# Patient Record
Sex: FEMALE | Race: WHITE | NOT HISPANIC OR LATINO | Employment: FULL TIME | URBAN - METROPOLITAN AREA
[De-identification: names, ages, dates, MRNs, and addresses within clinical notes are randomized per-mention and may not be internally consistent; named-entity substitution may affect disease eponyms.]

---

## 2021-11-15 ENCOUNTER — APPOINTMENT (OUTPATIENT)
Dept: GENERAL RADIOLOGY | Age: 61
End: 2021-11-15
Attending: EMERGENCY MEDICINE

## 2021-11-15 LAB
ALBUMIN SERPL-MCNC: 3.7 G/DL (ref 3.6–5.1)
ALBUMIN/GLOB SERPL: 1 {RATIO} (ref 1–2.4)
ALP SERPL-CCNC: 59 UNITS/L (ref 45–117)
ALT SERPL-CCNC: 33 UNITS/L
ANION GAP SERPL CALC-SCNC: 9 MMOL/L (ref 10–20)
AST SERPL-CCNC: 16 UNITS/L
BASOPHILS # BLD: 0 K/MCL (ref 0–0.3)
BASOPHILS NFR BLD: 1 %
BILIRUB SERPL-MCNC: 0.4 MG/DL (ref 0.2–1)
BUN SERPL-MCNC: 18 MG/DL (ref 6–20)
BUN/CREAT SERPL: 22 (ref 7–25)
CALCIUM SERPL-MCNC: 9.1 MG/DL (ref 8.4–10.2)
CHLORIDE SERPL-SCNC: 110 MMOL/L (ref 98–107)
CO2 SERPL-SCNC: 24 MMOL/L (ref 21–32)
CREAT SERPL-MCNC: 0.82 MG/DL (ref 0.51–0.95)
DEPRECATED RDW RBC: 45.8 FL (ref 39–50)
EOSINOPHIL # BLD: 0.2 K/MCL (ref 0–0.5)
EOSINOPHIL NFR BLD: 4 %
ERYTHROCYTE [DISTWIDTH] IN BLOOD: 14.1 % (ref 11–15)
FASTING DURATION TIME PATIENT: ABNORMAL H
GFR SERPLBLD BASED ON 1.73 SQ M-ARVRAT: 77 ML/MIN
GLOBULIN SER-MCNC: 3.8 G/DL (ref 2–4)
GLUCOSE SERPL-MCNC: 143 MG/DL (ref 70–99)
HCT VFR BLD CALC: 41.9 % (ref 36–46.5)
HGB BLD-MCNC: 13.6 G/DL (ref 12–15.5)
IMM GRANULOCYTES # BLD AUTO: 0 K/MCL (ref 0–0.2)
IMM GRANULOCYTES # BLD: 0 %
LYMPHOCYTES # BLD: 1.6 K/MCL (ref 1–4)
LYMPHOCYTES NFR BLD: 27 %
MCH RBC QN AUTO: 28.6 PG (ref 26–34)
MCHC RBC AUTO-ENTMCNC: 32.5 G/DL (ref 32–36.5)
MCV RBC AUTO: 88.2 FL (ref 78–100)
MONOCYTES # BLD: 0.5 K/MCL (ref 0.3–0.9)
MONOCYTES NFR BLD: 9 %
NEUTROPHILS # BLD: 3.4 K/MCL (ref 1.8–7.7)
NEUTROPHILS NFR BLD: 59 %
NRBC BLD MANUAL-RTO: 0 /100 WBC
PLATELET # BLD AUTO: 206 K/MCL (ref 140–450)
POTASSIUM SERPL-SCNC: 3.9 MMOL/L (ref 3.4–5.1)
PROT SERPL-MCNC: 7.5 G/DL (ref 6.4–8.2)
RBC # BLD: 4.75 MIL/MCL (ref 4–5.2)
SODIUM SERPL-SCNC: 139 MMOL/L (ref 135–145)
TROPONIN I SERPL DL<=0.01 NG/ML-MCNC: <4 NG/L
WBC # BLD: 5.7 K/MCL (ref 4.2–11)

## 2021-11-15 PROCEDURE — 93005 ELECTROCARDIOGRAM TRACING: CPT | Performed by: EMERGENCY MEDICINE

## 2021-11-15 PROCEDURE — 99285 EMERGENCY DEPT VISIT HI MDM: CPT

## 2021-11-15 PROCEDURE — 84484 ASSAY OF TROPONIN QUANT: CPT | Performed by: EMERGENCY MEDICINE

## 2021-11-15 PROCEDURE — 71046 X-RAY EXAM CHEST 2 VIEWS: CPT

## 2021-11-15 PROCEDURE — 85025 COMPLETE CBC W/AUTO DIFF WBC: CPT | Performed by: EMERGENCY MEDICINE

## 2021-11-15 PROCEDURE — 36415 COLL VENOUS BLD VENIPUNCTURE: CPT

## 2021-11-15 PROCEDURE — M0243 CASIRIVI AND IMDEVI INFUSION: HCPCS

## 2021-11-15 PROCEDURE — 80053 COMPREHEN METABOLIC PANEL: CPT | Performed by: EMERGENCY MEDICINE

## 2021-11-15 ASSESSMENT — PAIN SCALES - GENERAL: PAINLEVEL_OUTOF10: 5

## 2021-11-16 ENCOUNTER — HOSPITAL ENCOUNTER (EMERGENCY)
Age: 61
Discharge: HOME | End: 2021-11-16
Attending: EMERGENCY MEDICINE

## 2021-11-16 VITALS
TEMPERATURE: 98.4 F | RESPIRATION RATE: 16 BRPM | HEART RATE: 80 BPM | OXYGEN SATURATION: 93 % | SYSTOLIC BLOOD PRESSURE: 139 MMHG | DIASTOLIC BLOOD PRESSURE: 70 MMHG

## 2021-11-16 DIAGNOSIS — U07.1 COVID-19 VIRUS DETECTED: Primary | ICD-10-CM

## 2021-11-16 LAB
ATRIAL RATE (BPM): 96
P AXIS (DEGREES): 44
PR-INTERVAL (MSEC): 164
QRS-INTERVAL (MSEC): 84
QT-INTERVAL (MSEC): 338
QTC: 427
R AXIS (DEGREES): 60
REPORT TEXT: NORMAL
T AXIS (DEGREES): 86
VENTRICULAR RATE EKG/MIN (BPM): 96

## 2021-11-16 PROCEDURE — 99285 EMERGENCY DEPT VISIT HI MDM: CPT | Performed by: STUDENT IN AN ORGANIZED HEALTH CARE EDUCATION/TRAINING PROGRAM

## 2021-11-16 PROCEDURE — 10002807 HB RX 258: Performed by: STUDENT IN AN ORGANIZED HEALTH CARE EDUCATION/TRAINING PROGRAM

## 2021-11-16 PROCEDURE — 10002800 HB RX 250 W HCPCS: Performed by: STUDENT IN AN ORGANIZED HEALTH CARE EDUCATION/TRAINING PROGRAM

## 2021-11-16 RX ORDER — 0.9 % SODIUM CHLORIDE 0.9 %
2 VIAL (ML) INJECTION EVERY 12 HOURS SCHEDULED
Status: DISCONTINUED | OUTPATIENT
Start: 2021-11-16 | End: 2021-11-16 | Stop reason: HOSPADM

## 2021-11-16 RX ORDER — ALBUTEROL SULFATE 2.5 MG/3ML
5 SOLUTION RESPIRATORY (INHALATION)
Status: DISCONTINUED | OUTPATIENT
Start: 2021-11-16 | End: 2021-11-16 | Stop reason: HOSPADM

## 2021-11-16 RX ORDER — METHYLPREDNISOLONE SODIUM SUCCINATE 125 MG/2ML
125 INJECTION, POWDER, LYOPHILIZED, FOR SOLUTION INTRAMUSCULAR; INTRAVENOUS
Status: DISCONTINUED | OUTPATIENT
Start: 2021-11-16 | End: 2021-11-16 | Stop reason: HOSPADM

## 2021-11-16 RX ORDER — SODIUM CHLORIDE 9 MG/ML
INJECTION, SOLUTION INTRAVENOUS CONTINUOUS PRN
Status: DISCONTINUED | OUTPATIENT
Start: 2021-11-16 | End: 2021-11-16 | Stop reason: HOSPADM

## 2021-11-16 RX ORDER — DIPHENHYDRAMINE HYDROCHLORIDE 50 MG/ML
50 INJECTION INTRAMUSCULAR; INTRAVENOUS
Status: DISCONTINUED | OUTPATIENT
Start: 2021-11-16 | End: 2021-11-16 | Stop reason: HOSPADM

## 2021-11-16 RX ORDER — FAMOTIDINE 10 MG/ML
20 INJECTION, SOLUTION INTRAVENOUS
Status: DISCONTINUED | OUTPATIENT
Start: 2021-11-16 | End: 2021-11-16 | Stop reason: HOSPADM

## 2021-11-16 RX ADMIN — Medication 600 MG OF CASIRIVIMAB: at 13:07

## 2021-11-16 ASSESSMENT — ENCOUNTER SYMPTOMS
BACK PAIN: 0
BLOOD IN STOOL: 0
APPETITE CHANGE: 0
FEVER: 0
NUMBNESS: 0
COUGH: 1
DIZZINESS: 0
NAUSEA: 0
ACTIVITY CHANGE: 0
SHORTNESS OF BREATH: 0
DIARRHEA: 0
EYE PAIN: 0
HEADACHES: 0
VOMITING: 0
SINUS PRESSURE: 1
RHINORRHEA: 0
LIGHT-HEADEDNESS: 0
WEAKNESS: 0
ABDOMINAL PAIN: 0
CHILLS: 0
WHEEZING: 0
UNEXPECTED WEIGHT CHANGE: 0
ABDOMINAL DISTENTION: 0
TROUBLE SWALLOWING: 0

## 2021-12-23 ENCOUNTER — HOSPITAL ENCOUNTER (OUTPATIENT)
Dept: CT IMAGING | Age: 61
End: 2021-12-23
Attending: INTERNAL MEDICINE

## 2022-08-27 ENCOUNTER — TELEPHONE (OUTPATIENT)
Dept: GENERAL RADIOLOGY | Age: 62
End: 2022-08-27

## 2022-08-30 ENCOUNTER — HOSPITAL ENCOUNTER (OUTPATIENT)
Dept: NUCLEAR MEDICINE | Age: 62
Discharge: HOME | End: 2022-08-30
Attending: SPECIALIST

## 2022-08-30 ENCOUNTER — HOSPITAL ENCOUNTER (OUTPATIENT)
Dept: CARDIOLOGY | Age: 62
Discharge: HOME | End: 2022-08-30
Attending: SPECIALIST

## 2022-08-30 VITALS — SYSTOLIC BLOOD PRESSURE: 131 MMHG | DIASTOLIC BLOOD PRESSURE: 81 MMHG | OXYGEN SATURATION: 96 % | HEART RATE: 91 BPM

## 2022-08-30 DIAGNOSIS — R06.00 DYSPNEA: ICD-10-CM

## 2022-08-30 DIAGNOSIS — I83.893 VARICOSE VEINS OF BILATERAL LOWER EXTREMITIES WITH OTHER COMPLICATIONS: ICD-10-CM

## 2022-08-30 LAB — STRESS TARGET HR: 157 BPM

## 2022-08-30 PROCEDURE — 78452 HT MUSCLE IMAGE SPECT MULT: CPT

## 2022-08-30 PROCEDURE — A9500 TC99M SESTAMIBI: HCPCS | Performed by: SPECIALIST

## 2022-08-30 PROCEDURE — 10006150 HB RX 343: Performed by: SPECIALIST

## 2022-08-30 PROCEDURE — 10002800 HB RX 250 W HCPCS: Performed by: SPECIALIST

## 2022-08-30 PROCEDURE — 93017 CV STRESS TEST TRACING ONLY: CPT

## 2022-08-30 RX ORDER — REGADENOSON 0.08 MG/ML
0.4 INJECTION, SOLUTION INTRAVENOUS ONCE
Status: COMPLETED | OUTPATIENT
Start: 2022-08-30 | End: 2022-08-30

## 2022-08-30 RX ORDER — TETRAKIS(2-METHOXYISOBUTYLISOCYANIDE)COPPER(I) TETRAFLUOROBORATE 1 MG/ML
10.4 INJECTION, POWDER, LYOPHILIZED, FOR SOLUTION INTRAVENOUS ONCE
Status: COMPLETED | OUTPATIENT
Start: 2022-08-30 | End: 2022-08-30

## 2022-08-30 RX ORDER — TETRAKIS(2-METHOXYISOBUTYLISOCYANIDE)COPPER(I) TETRAFLUOROBORATE 1 MG/ML
31.4 INJECTION, POWDER, LYOPHILIZED, FOR SOLUTION INTRAVENOUS ONCE
Status: COMPLETED | OUTPATIENT
Start: 2022-08-30 | End: 2022-08-30

## 2022-08-30 RX ADMIN — TETRAKIS(2-METHOXYISOBUTYLISOCYANIDE)COPPER(I) TETRAFLUOROBORATE 10.4 MILLICURIE: 1 INJECTION, POWDER, LYOPHILIZED, FOR SOLUTION INTRAVENOUS at 08:01

## 2022-08-30 RX ADMIN — REGADENOSON 0.4 MG: 0.08 INJECTION, SOLUTION INTRAVENOUS at 09:21

## 2022-08-30 RX ADMIN — TETRAKIS(2-METHOXYISOBUTYLISOCYANIDE)COPPER(I) TETRAFLUOROBORATE 31.4 MILLICURIE: 1 INJECTION, POWDER, LYOPHILIZED, FOR SOLUTION INTRAVENOUS at 10:15

## 2022-10-27 ENCOUNTER — APPOINTMENT (OUTPATIENT)
Dept: ULTRASOUND IMAGING | Age: 62
End: 2022-10-27
Attending: EMERGENCY MEDICINE

## 2022-10-27 VITALS
SYSTOLIC BLOOD PRESSURE: 142 MMHG | HEART RATE: 84 BPM | OXYGEN SATURATION: 97 % | TEMPERATURE: 98.2 F | DIASTOLIC BLOOD PRESSURE: 79 MMHG | RESPIRATION RATE: 16 BRPM

## 2022-10-27 LAB
ALBUMIN SERPL-MCNC: 3.7 G/DL (ref 3.6–5.1)
ALBUMIN/GLOB SERPL: 1.1 {RATIO} (ref 1–2.4)
ALP SERPL-CCNC: 62 UNITS/L (ref 45–117)
ALT SERPL-CCNC: 32 UNITS/L
ANION GAP SERPL CALC-SCNC: 9 MMOL/L (ref 7–19)
AST SERPL-CCNC: 18 UNITS/L
ATRIAL RATE (BPM): 98
BASOPHILS # BLD: 0.1 K/MCL (ref 0–0.3)
BASOPHILS NFR BLD: 1 %
BILIRUB SERPL-MCNC: 0.6 MG/DL (ref 0.2–1)
BUN SERPL-MCNC: 26 MG/DL (ref 6–20)
BUN/CREAT SERPL: 31 (ref 7–25)
CALCIUM SERPL-MCNC: 9.3 MG/DL (ref 8.4–10.2)
CHLORIDE SERPL-SCNC: 109 MMOL/L (ref 97–110)
CO2 SERPL-SCNC: 27 MMOL/L (ref 21–32)
CREAT SERPL-MCNC: 0.85 MG/DL (ref 0.51–0.95)
DEPRECATED RDW RBC: 44.5 FL (ref 39–50)
EOSINOPHIL # BLD: 0.3 K/MCL (ref 0–0.5)
EOSINOPHIL NFR BLD: 4 %
ERYTHROCYTE [DISTWIDTH] IN BLOOD: 13.8 % (ref 11–15)
FASTING DURATION TIME PATIENT: ABNORMAL H
GFR SERPLBLD BASED ON 1.73 SQ M-ARVRAT: 77 ML/MIN
GLOBULIN SER-MCNC: 3.5 G/DL (ref 2–4)
GLUCOSE SERPL-MCNC: 113 MG/DL (ref 70–99)
HCT VFR BLD CALC: 40.5 % (ref 36–46.5)
HGB BLD-MCNC: 13.3 G/DL (ref 12–15.5)
IMM GRANULOCYTES # BLD AUTO: 0 K/MCL (ref 0–0.2)
IMM GRANULOCYTES # BLD: 0 %
LYMPHOCYTES # BLD: 2 K/MCL (ref 1–4)
LYMPHOCYTES NFR BLD: 27 %
MCH RBC QN AUTO: 29 PG (ref 26–34)
MCHC RBC AUTO-ENTMCNC: 32.8 G/DL (ref 32–36.5)
MCV RBC AUTO: 88.2 FL (ref 78–100)
MONOCYTES # BLD: 0.5 K/MCL (ref 0.3–0.9)
MONOCYTES NFR BLD: 6 %
NEUTROPHILS # BLD: 4.6 K/MCL (ref 1.8–7.7)
NEUTROPHILS NFR BLD: 62 %
NRBC BLD MANUAL-RTO: 0 /100 WBC
P AXIS (DEGREES): 55
PLATELET # BLD AUTO: 242 K/MCL (ref 140–450)
POTASSIUM SERPL-SCNC: 4.1 MMOL/L (ref 3.4–5.1)
PR-INTERVAL (MSEC): 188
PROT SERPL-MCNC: 7.2 G/DL (ref 6.4–8.2)
QRS-INTERVAL (MSEC): 86
QT-INTERVAL (MSEC): 360
QTC: 460
R AXIS (DEGREES): 61
RBC # BLD: 4.59 MIL/MCL (ref 4–5.2)
REPORT TEXT: NORMAL
SODIUM SERPL-SCNC: 141 MMOL/L (ref 135–145)
T AXIS (DEGREES): 63
VENTRICULAR RATE EKG/MIN (BPM): 98
WBC # BLD: 7.5 K/MCL (ref 4.2–11)

## 2022-10-27 PROCEDURE — 93010 ELECTROCARDIOGRAM REPORT: CPT | Performed by: INTERNAL MEDICINE

## 2022-10-27 PROCEDURE — 36415 COLL VENOUS BLD VENIPUNCTURE: CPT

## 2022-10-27 PROCEDURE — 93971 EXTREMITY STUDY: CPT

## 2022-10-27 PROCEDURE — 99284 EMERGENCY DEPT VISIT MOD MDM: CPT

## 2022-10-27 PROCEDURE — 85025 COMPLETE CBC W/AUTO DIFF WBC: CPT | Performed by: EMERGENCY MEDICINE

## 2022-10-27 PROCEDURE — 93005 ELECTROCARDIOGRAM TRACING: CPT | Performed by: EMERGENCY MEDICINE

## 2022-10-27 PROCEDURE — 80053 COMPREHEN METABOLIC PANEL: CPT | Performed by: EMERGENCY MEDICINE

## 2022-10-27 ASSESSMENT — PAIN SCALES - GENERAL: PAINLEVEL_OUTOF10: 8

## 2022-10-28 ENCOUNTER — HOSPITAL ENCOUNTER (EMERGENCY)
Age: 62
Discharge: HOME | End: 2022-10-28
Attending: EMERGENCY MEDICINE

## 2022-10-28 DIAGNOSIS — M79.604 PAIN OF RIGHT LOWER EXTREMITY: Primary | ICD-10-CM

## 2022-10-28 DIAGNOSIS — M71.21 SYNOVIAL CYST OF RIGHT POPLITEAL SPACE: ICD-10-CM

## 2022-10-28 PROCEDURE — 99284 EMERGENCY DEPT VISIT MOD MDM: CPT | Performed by: EMERGENCY MEDICINE

## 2022-10-28 ASSESSMENT — ENCOUNTER SYMPTOMS
DIARRHEA: 0
NAUSEA: 0
SORE THROAT: 0
DIAPHORESIS: 0
FEVER: 0
VOMITING: 0
SHORTNESS OF BREATH: 0
HEADACHES: 0
ABDOMINAL PAIN: 0

## 2023-09-22 DIAGNOSIS — M76.821 POSTERIOR TIBIAL TENDINITIS OF RIGHT LEG: Primary | ICD-10-CM

## 2023-09-28 ENCOUNTER — HOSPITAL ENCOUNTER (OUTPATIENT)
Dept: MRI IMAGING | Age: 63
Discharge: HOME | End: 2023-09-28
Attending: PODIATRIST

## 2023-09-28 DIAGNOSIS — M76.821 POSTERIOR TIBIAL TENDINITIS OF RIGHT LEG: ICD-10-CM

## 2023-09-28 PROCEDURE — G1004 CDSM NDSC: HCPCS

## 2023-09-28 PROCEDURE — 73721 MRI JNT OF LWR EXTRE W/O DYE: CPT

## 2024-08-01 DIAGNOSIS — M25.511 PAIN IN RIGHT SHOULDER: Primary | ICD-10-CM

## 2024-08-13 ENCOUNTER — HOSPITAL ENCOUNTER (OUTPATIENT)
Dept: MRI IMAGING | Age: 64
Discharge: HOME | End: 2024-08-13
Attending: FAMILY MEDICINE

## 2024-08-13 DIAGNOSIS — M25.511 PAIN IN RIGHT SHOULDER: ICD-10-CM

## 2024-08-13 PROCEDURE — 73221 MRI JOINT UPR EXTREM W/O DYE: CPT

## 2024-10-02 DIAGNOSIS — I50.32 CHRONIC DIASTOLIC HEART FAILURE  (CMD): Primary | ICD-10-CM

## 2024-10-25 ENCOUNTER — HOSPITAL ENCOUNTER (OUTPATIENT)
Dept: NUCLEAR MEDICINE | Age: 64
End: 2024-10-25
Attending: SPECIALIST

## 2024-10-25 ENCOUNTER — HOSPITAL ENCOUNTER (OUTPATIENT)
Dept: CARDIOLOGY | Age: 64
End: 2024-10-25
Attending: SPECIALIST

## 2024-10-25 VITALS
DIASTOLIC BLOOD PRESSURE: 84 MMHG | OXYGEN SATURATION: 96 % | RESPIRATION RATE: 17 BRPM | HEART RATE: 68 BPM | HEIGHT: 65 IN | BODY MASS INDEX: 41.65 KG/M2 | SYSTOLIC BLOOD PRESSURE: 129 MMHG | WEIGHT: 250 LBS

## 2024-10-25 DIAGNOSIS — I50.32 CHRONIC DIASTOLIC HEART FAILURE  (CMD): ICD-10-CM

## 2024-10-25 PROCEDURE — 78452 HT MUSCLE IMAGE SPECT MULT: CPT

## 2024-10-25 PROCEDURE — 78452 HT MUSCLE IMAGE SPECT MULT: CPT | Performed by: INTERNAL MEDICINE

## 2024-10-25 PROCEDURE — 93018 CV STRESS TEST I&R ONLY: CPT | Performed by: INTERNAL MEDICINE

## 2024-10-25 PROCEDURE — 93017 CV STRESS TEST TRACING ONLY: CPT

## 2024-10-25 PROCEDURE — 93016 CV STRESS TEST SUPVJ ONLY: CPT | Performed by: INTERNAL MEDICINE

## 2024-10-25 PROCEDURE — 10006150 HB RX 343: Performed by: SPECIALIST

## 2024-10-25 PROCEDURE — A9500 TC99M SESTAMIBI: HCPCS | Performed by: SPECIALIST

## 2024-10-25 RX ORDER — TETRAKIS(2-METHOXYISOBUTYLISOCYANIDE)COPPER(I) TETRAFLUOROBORATE 1 MG/ML
8.87 INJECTION, POWDER, LYOPHILIZED, FOR SOLUTION INTRAVENOUS ONCE
Status: COMPLETED | OUTPATIENT
Start: 2024-10-25 | End: 2024-10-25

## 2024-10-25 RX ORDER — TETRAKIS(2-METHOXYISOBUTYLISOCYANIDE)COPPER(I) TETRAFLUOROBORATE 1 MG/ML
25 INJECTION, POWDER, LYOPHILIZED, FOR SOLUTION INTRAVENOUS ONCE
Status: COMPLETED | OUTPATIENT
Start: 2024-10-25 | End: 2024-10-25

## 2024-10-25 RX ADMIN — TETRAKIS(2-METHOXYISOBUTYLISOCYANIDE)COPPER(I) TETRAFLUOROBORATE 8.87 MILLICURIE: 1 INJECTION, POWDER, LYOPHILIZED, FOR SOLUTION INTRAVENOUS at 07:40

## 2024-10-25 RX ADMIN — TETRAKIS(2-METHOXYISOBUTYLISOCYANIDE)COPPER(I) TETRAFLUOROBORATE 25 MILLICURIE: 1 INJECTION, POWDER, LYOPHILIZED, FOR SOLUTION INTRAVENOUS at 08:58

## 2024-12-26 DIAGNOSIS — M25.562 PAIN IN LEFT KNEE: Primary | ICD-10-CM

## 2024-12-26 DIAGNOSIS — G47.30 SLEEP APNEA: ICD-10-CM

## 2025-01-11 ENCOUNTER — HOSPITAL ENCOUNTER (OUTPATIENT)
Dept: MRI IMAGING | Age: 65
End: 2025-01-11
Attending: FAMILY MEDICINE

## 2025-01-11 DIAGNOSIS — M25.562 PAIN IN LEFT KNEE: ICD-10-CM

## 2025-01-11 DIAGNOSIS — G47.30 SLEEP APNEA: ICD-10-CM

## 2025-01-11 PROCEDURE — 73721 MRI JNT OF LWR EXTRE W/O DYE: CPT

## 2025-01-29 ENCOUNTER — EXTERNAL LAB (OUTPATIENT)
Dept: HEALTH INFORMATION MANAGEMENT | Facility: OTHER | Age: 65
End: 2025-01-29

## 2025-01-29 LAB
ALBUMIN SERPL-MCNC: 4.3 G/DL (ref 3.9–4.9)
ALP SERPL-CCNC: 79 IU/L (ref 44–121)
ALT SERPL-CCNC: 16 IU/L (ref 0–32)
AST SERPL-CCNC: 17 IU/L (ref 0–40)
BASOPHILS # BLD: 0.1 X10E3/UL (ref 0–0.2)
BASOPHILS NFR BLD: 1 %
BILIRUB SERPL-MCNC: 0.4 MG/DL (ref 0–1.2)
BUN SERPL-MCNC: 20 MG/DL (ref 8–27)
BUN/CREAT SERPL: 28 (ref 12–28)
CALCIUM SERPL-MCNC: 9.4 MG/DL (ref 8.7–10.3)
CHLORIDE SERPL-SCNC: 105 MMOL/L (ref 96–106)
CO2 SERPL-SCNC: 23 MMOL/L (ref 20–29)
CREAT SERPL-MCNC: 0.72 MG/DL (ref 0.57–1)
EGFRCR SERPLBLD CKD-EPI 2021: 93 ML/MIN/1.73
EOSINOPHIL # BLD: 0.3 X10E3/UL (ref 0–0.4)
EOSINOPHIL NFR BLD: 4 %
ERYTHROCYTE [DISTWIDTH] IN BLOOD: 15.5 % (ref 11.7–15.4)
GLOBULIN SER-MCNC: 2.3 G/DL (ref 1.5–4.5)
GLUCOSE SERPL-MCNC: 113 MG/DL (ref 70–99)
HCT VFR BLD CALC: 39.7 % (ref 34–46.6)
HGB BLD-MCNC: 12.2 G/DL (ref 11.1–15.9)
IMM GRANULOCYTES # BLD: 0 X10E3/UL (ref 0–0.1)
IMM GRANULOCYTES NFR BLD: 0 %
LAB RESULT: NORMAL
LENGTH OF FAST TIME PATIENT: ABNORMAL H
LYMPHOCYTES # BLD: 2.8 X10E3/UL (ref 0.7–3.1)
LYMPHOCYTES NFR BLD: 35 %
MCH RBC QN AUTO: 26.6 PG (ref 26.6–33)
MCHC RBC AUTO-ENTMCNC: 30.7 G/DL (ref 31.5–35.7)
MCV RBC AUTO: 87 FL (ref 79–97)
MONOCYTES # BLD: 0.4 X10E3/UL (ref 0.1–0.9)
MONOCYTES NFR BLD: 5 %
NEUTROPHILS # BLD: 4.4 X10E3/UL (ref 1.4–7)
NEUTROPHILS NFR BLD: 55 %
PLATELET # BLD: 330 X10E3/UL (ref 150–450)
POTASSIUM SERPL-SCNC: 4.5 MMOL/L (ref 3.5–5.2)
PROT SERPL-MCNC: 6.6 G/DL (ref 6–8.5)
RBC # BLD: 4.59 X10E6/UL (ref 3.77–5.28)
SODIUM SERPL-SCNC: 143 MMOL/L (ref 134–144)
WBC # BLD: 8.1 X10E3/UL (ref 3.4–10.8)

## 2025-01-31 ENCOUNTER — ANESTHESIA EVENT (OUTPATIENT)
Dept: SURGERY | Age: 65
End: 2025-01-31

## 2025-01-31 RX ORDER — FUROSEMIDE 40 MG/1
40 TABLET ORAL DAILY
COMMUNITY
Start: 2025-01-07

## 2025-01-31 RX ORDER — LOSARTAN POTASSIUM 50 MG/1
50 TABLET ORAL DAILY
COMMUNITY
Start: 2024-09-01

## 2025-01-31 RX ORDER — EMPAGLIFLOZIN 10 MG/1
10 TABLET, FILM COATED ORAL EVERY MORNING
COMMUNITY
Start: 2025-01-02

## 2025-01-31 SDOH — SOCIAL STABILITY: SOCIAL INSECURITY: HOW OFTEN DOES ANYONE, INCLUDING FAMILY AND FRIENDS, PHYSICALLY HURT YOU?: NEVER

## 2025-01-31 SDOH — SOCIAL STABILITY: SOCIAL INSECURITY: HOW OFTEN DOES ANYONE, INCLUDING FAMILY AND FRIENDS, INSULT OR TALK DOWN TO YOU?: NEVER

## 2025-01-31 SDOH — SOCIAL STABILITY: SOCIAL INSECURITY: HOW OFTEN DOES ANYONE, INCLUDING FAMILY AND FRIENDS, SCREAM OR CURSE AT YOU?: NEVER

## 2025-01-31 SDOH — SOCIAL STABILITY: SOCIAL INSECURITY: HOW OFTEN DOES ANYONE, INCLUDING FAMILY AND FRIENDS, THREATEN YOU WITH HARM?: NEVER

## 2025-01-31 ASSESSMENT — ACTIVITIES OF DAILY LIVING (ADL)
CHRONIC_PAIN_PRESENT: NO
RECENT_DECLINE_ADL: NO
ADL_SHORT_OF_BREATH: NO
SENSORY_SUPPORT_DEVICES: EYEGLASSES
HISTORY OF FALLING IN THE LAST YEAR (PRIOR TO ADMISSION): NO
ADL_BEFORE_ADMISSION: INDEPENDENT
ADL_SCORE: 12
NEEDS_ASSIST: NO

## 2025-02-04 ENCOUNTER — HOSPITAL ENCOUNTER (OUTPATIENT)
Age: 65
Discharge: HOME | End: 2025-02-04
Attending: ORTHOPAEDIC SURGERY | Admitting: ORTHOPAEDIC SURGERY

## 2025-02-04 ENCOUNTER — ANESTHESIA (OUTPATIENT)
Dept: SURGERY | Age: 65
End: 2025-02-04

## 2025-02-04 DIAGNOSIS — M25.562 ACUTE PAIN OF LEFT KNEE: Primary | ICD-10-CM

## 2025-02-04 PROCEDURE — 10002807 HB RX 258: Performed by: NEUROLOGICAL SURGERY

## 2025-02-04 PROCEDURE — 10002801 HB RX 250 W/O HCPCS: Performed by: NEUROLOGICAL SURGERY

## 2025-02-04 PROCEDURE — 13000002 HB ANESTHESIA  GENERAL   S/U + 1ST 15 MIN: Performed by: ORTHOPAEDIC SURGERY

## 2025-02-04 PROCEDURE — 13000116 HB ORTHO COMPLEX CASE S/U + 1ST 15 MIN: Performed by: ORTHOPAEDIC SURGERY

## 2025-02-04 PROCEDURE — 10002800 HB RX 250 W HCPCS: Performed by: NEUROLOGICAL SURGERY

## 2025-02-04 PROCEDURE — 13000117 HB ORTHO COMPLEX CASE EA ADD MINUTE: Performed by: ORTHOPAEDIC SURGERY

## 2025-02-04 PROCEDURE — 13000003 HB ANESTHESIA  GENERAL EA ADD MINUTE: Performed by: ORTHOPAEDIC SURGERY

## 2025-02-04 PROCEDURE — 10002800 HB RX 250 W HCPCS

## 2025-02-04 PROCEDURE — 10002800 HB RX 250 W HCPCS: Performed by: ORTHOPAEDIC SURGERY

## 2025-02-04 PROCEDURE — X1094 NO CHARGE VISIT: HCPCS

## 2025-02-04 PROCEDURE — 10004452 HB PACU ADDL 30 MINUTES: Performed by: ORTHOPAEDIC SURGERY

## 2025-02-04 PROCEDURE — 10002803 HB RX 637: Performed by: NEUROLOGICAL SURGERY

## 2025-02-04 PROCEDURE — 29880 ARTHRS KNE SRG MNISECTMY M&L: CPT | Performed by: ORTHOPAEDIC SURGERY

## 2025-02-04 PROCEDURE — 10006023 HB SUPPLY 272: Performed by: ORTHOPAEDIC SURGERY

## 2025-02-04 PROCEDURE — 13000001 HB PHASE II RECOVERY EA 30 MINUTES: Performed by: ORTHOPAEDIC SURGERY

## 2025-02-04 PROCEDURE — 10004451 HB PACU RECOVERY 1ST 30 MINUTES: Performed by: ORTHOPAEDIC SURGERY

## 2025-02-04 RX ORDER — DEXAMETHASONE SODIUM PHOSPHATE 4 MG/ML
INJECTION, SOLUTION INTRA-ARTICULAR; INTRALESIONAL; INTRAMUSCULAR; INTRAVENOUS; SOFT TISSUE PRN
Status: DISCONTINUED | OUTPATIENT
Start: 2025-02-04 | End: 2025-02-04

## 2025-02-04 RX ORDER — PROCHLORPERAZINE EDISYLATE 5 MG/ML
5 INJECTION INTRAMUSCULAR; INTRAVENOUS EVERY 4 HOURS PRN
Status: DISCONTINUED | OUTPATIENT
Start: 2025-02-04 | End: 2025-02-04 | Stop reason: HOSPADM

## 2025-02-04 RX ORDER — LIDOCAINE HYDROCHLORIDE 10 MG/ML
5 INJECTION, SOLUTION EPIDURAL; INFILTRATION; INTRACAUDAL; PERINEURAL PRN
Status: DISCONTINUED | OUTPATIENT
Start: 2025-02-04 | End: 2025-02-04 | Stop reason: HOSPADM

## 2025-02-04 RX ORDER — DEXTROSE MONOHYDRATE 25 G/50ML
25 INJECTION, SOLUTION INTRAVENOUS PRN
Status: DISCONTINUED | OUTPATIENT
Start: 2025-02-04 | End: 2025-02-04 | Stop reason: HOSPADM

## 2025-02-04 RX ORDER — ALBUTEROL SULFATE 90 UG/1
INHALANT RESPIRATORY (INHALATION) PRN
Status: DISCONTINUED | OUTPATIENT
Start: 2025-02-04 | End: 2025-02-04

## 2025-02-04 RX ORDER — CEFAZOLIN SODIUM 1 G/3ML
INJECTION, POWDER, FOR SOLUTION INTRAMUSCULAR; INTRAVENOUS PRN
Status: DISCONTINUED | OUTPATIENT
Start: 2025-02-04 | End: 2025-02-04

## 2025-02-04 RX ORDER — METOCLOPRAMIDE HYDROCHLORIDE 5 MG/ML
INJECTION INTRAMUSCULAR; INTRAVENOUS PRN
Status: DISCONTINUED | OUTPATIENT
Start: 2025-02-04 | End: 2025-02-04

## 2025-02-04 RX ORDER — MIDAZOLAM HYDROCHLORIDE 1 MG/ML
INJECTION, SOLUTION INTRAMUSCULAR; INTRAVENOUS PRN
Status: DISCONTINUED | OUTPATIENT
Start: 2025-02-04 | End: 2025-02-04

## 2025-02-04 RX ORDER — LIDOCAINE HYDROCHLORIDE 20 MG/ML
INJECTION, SOLUTION INFILTRATION; PERINEURAL PRN
Status: DISCONTINUED | OUTPATIENT
Start: 2025-02-04 | End: 2025-02-04

## 2025-02-04 RX ORDER — NICOTINE POLACRILEX 4 MG
30 LOZENGE BUCCAL
Status: DISCONTINUED | OUTPATIENT
Start: 2025-02-04 | End: 2025-02-04 | Stop reason: HOSPADM

## 2025-02-04 RX ORDER — 0.9 % SODIUM CHLORIDE 0.9 %
10 VIAL (ML) INJECTION PRN
Status: DISCONTINUED | OUTPATIENT
Start: 2025-02-04 | End: 2025-02-04 | Stop reason: HOSPADM

## 2025-02-04 RX ORDER — HYDROCODONE BITARTRATE AND ACETAMINOPHEN 5; 325 MG/1; MG/1
1 TABLET ORAL EVERY 8 HOURS PRN
Qty: 12 TABLET | Refills: 0 | Status: SHIPPED | OUTPATIENT
Start: 2025-02-04

## 2025-02-04 RX ORDER — BUPIVACAINE HYDROCHLORIDE 5 MG/ML
INJECTION, SOLUTION EPIDURAL; INTRACAUDAL PRN
Status: DISCONTINUED | OUTPATIENT
Start: 2025-02-04 | End: 2025-02-04 | Stop reason: HOSPADM

## 2025-02-04 RX ORDER — SODIUM CHLORIDE, SODIUM LACTATE, POTASSIUM CHLORIDE, CALCIUM CHLORIDE 600; 310; 30; 20 MG/100ML; MG/100ML; MG/100ML; MG/100ML
INJECTION, SOLUTION INTRAVENOUS CONTINUOUS PRN
Status: DISCONTINUED | OUTPATIENT
Start: 2025-02-04 | End: 2025-02-04

## 2025-02-04 RX ORDER — HYDRALAZINE HYDROCHLORIDE 20 MG/ML
5 INJECTION INTRAMUSCULAR; INTRAVENOUS EVERY 10 MIN PRN
Status: DISCONTINUED | OUTPATIENT
Start: 2025-02-04 | End: 2025-02-04 | Stop reason: HOSPADM

## 2025-02-04 RX ORDER — 0.9 % SODIUM CHLORIDE 0.9 %
2 VIAL (ML) INJECTION EVERY 12 HOURS SCHEDULED
Status: DISCONTINUED | OUTPATIENT
Start: 2025-02-04 | End: 2025-02-04 | Stop reason: HOSPADM

## 2025-02-04 RX ORDER — ASPIRIN 81 MG/1
81 TABLET ORAL DAILY
Qty: 14 TABLET | Refills: 0 | Status: SHIPPED | OUTPATIENT
Start: 2025-02-04

## 2025-02-04 RX ORDER — PROPOFOL 10 MG/ML
INJECTION, EMULSION INTRAVENOUS PRN
Status: DISCONTINUED | OUTPATIENT
Start: 2025-02-04 | End: 2025-02-04

## 2025-02-04 RX ADMIN — SODIUM CHLORIDE, POTASSIUM CHLORIDE, SODIUM LACTATE AND CALCIUM CHLORIDE: 600; 310; 30; 20 INJECTION, SOLUTION INTRAVENOUS at 17:11

## 2025-02-04 RX ADMIN — CEFAZOLIN 2 G: 2 INJECTION, POWDER, FOR SOLUTION INTRAMUSCULAR; INTRAVENOUS at 17:21

## 2025-02-04 RX ADMIN — PROPOFOL 100 MG: 10 INJECTION, EMULSION INTRAVENOUS at 17:26

## 2025-02-04 RX ADMIN — FENTANYL CITRATE 50 MCG: 50 INJECTION INTRAMUSCULAR; INTRAVENOUS at 17:25

## 2025-02-04 RX ADMIN — ALBUTEROL SULFATE 6 PUFF: 90 AEROSOL, METERED RESPIRATORY (INHALATION) at 18:08

## 2025-02-04 RX ADMIN — Medication 150 MG: at 17:26

## 2025-02-04 RX ADMIN — LIDOCAINE HYDROCHLORIDE 5 ML: 20 INJECTION, SOLUTION INFILTRATION; PERINEURAL at 17:25

## 2025-02-04 RX ADMIN — METOCLOPRAMIDE HYDROCHLORIDE 5 MG: 5 INJECTION INTRAMUSCULAR; INTRAVENOUS at 17:40

## 2025-02-04 RX ADMIN — FENTANYL CITRATE 50 MCG: 50 INJECTION INTRAMUSCULAR; INTRAVENOUS at 17:40

## 2025-02-04 RX ADMIN — LIDOCAINE HYDROCHLORIDE 5 ML: 20 INJECTION, SOLUTION INFILTRATION; PERINEURAL at 18:08

## 2025-02-04 RX ADMIN — PROPOFOL 50 MG: 10 INJECTION, EMULSION INTRAVENOUS at 17:38

## 2025-02-04 RX ADMIN — DEXAMETHASONE SODIUM PHOSPHATE 8 MG: 4 INJECTION INTRA-ARTICULAR; INTRALESIONAL; INTRAMUSCULAR; INTRAVENOUS; SOFT TISSUE at 17:43

## 2025-02-04 SDOH — SOCIAL STABILITY: SOCIAL INSECURITY: RISK FACTORS: HEART DISEASE

## 2025-02-04 SDOH — SOCIAL STABILITY: SOCIAL INSECURITY: RISK FACTORS: BMI> 30 (OBESITY)

## 2025-02-04 ASSESSMENT — PAIN SCALES - GENERAL
PAINLEVEL_OUTOF10: 5
PAINLEVEL_OUTOF10: 0
PAINLEVEL_OUTOF10: 0
PAINLEVEL_OUTOF10: 5
PAINLEVEL_OUTOF10: 5

## 2025-02-04 ASSESSMENT — ENCOUNTER SYMPTOMS
SEIZURES: 0
EXERCISE TOLERANCE: GOOD (>4 METS)

## 2025-02-05 VITALS
OXYGEN SATURATION: 96 % | RESPIRATION RATE: 16 BRPM | HEART RATE: 71 BPM | WEIGHT: 251.88 LBS | SYSTOLIC BLOOD PRESSURE: 149 MMHG | TEMPERATURE: 97.3 F | DIASTOLIC BLOOD PRESSURE: 88 MMHG | BODY MASS INDEX: 38.17 KG/M2 | HEIGHT: 68 IN

## 2025-02-19 ENCOUNTER — APPOINTMENT (OUTPATIENT)
Dept: ORTHOPEDICS | Age: 65
End: 2025-02-19

## 2025-02-19 DIAGNOSIS — M23.92 INTERNAL DERANGEMENT OF LEFT KNEE: Primary | ICD-10-CM

## 2025-03-25 NOTE — PROGRESS NOTES
FAMILY MEDICINE CLINIC NOTE    HPI  Kerry Grier is a 64 year old female presenting for       #Congestion  -started this weekend  -feels in sinus  -occasional coughing to cough out post nasal drip  -no sore throat  -no fever/chills  -watery eyes and watery nose    #MARLENY  -CPAP  -polysomnography 3/2025  -not currently seeing a specialist     #Chronic diastolic heart failure  #HTN  -no cardiologist at this time - needs a new cardiologist   -jardiance 10 mg daily started by cardiology   -losartan 100 mg daily  -furosemide 40 mg daily   -no CP, no SOB    #L knee pain  -left knee arthroscopy with partial medial meniscectomy and partial lateral meniscectomy 2/4/25  -ortho Dr Ulysses Mohr  -done with physical therapy     #Prediabetes  -A1c 6.1    #Obesity  -difficulty losing weight  -she reports she is not eating very much  -notes family history - sister with thyroid cancer    #Thyroid nodules  -FNA 12/2023 - benign follicular nodules (left lower medial, left lower lateral)    #Left adrenal nodule  -seen on prior CT chest 1/31/25 - left adrenal nodule 2.16 x 1.93 cm, [facility] as low as 1.32.     #History of cigarette smoker  -last CT lung screen 1/31/25 - no interval lung nodules    #Rotator cuff injury  -R shoulder  -MRI 8/14/24.  1.  Full-thickness retracted tear of the supraspinatus tendon.  2.  Moderate infraspinatus tendon without high-grade tear.  3.  Severe tendinosis and probable high-grade tear of the biceps long head tendon.  4.  Mild to moderate glenohumeral osteoarthrosis.  5.  Moderate effusion.  6.  Multiple loose bones in the subdeltoid bursa measuring up to 1.3 cm.   -has done physical therapy   -reports some improvement  -will discuss more during follow up    #History of uterine cancer  -2014  -reports history of hysterectomy  -no longer following with gynecology    ROS  GENERAL: No fever/chills, no recent weight loss  HEENT: No visual changes, no changes in hearing, no sore throats  NECK: No  pain, no swelling  RESP: No cough, no SOB  CV: No chest pain, no palpitations  GI: No abd pain, no N/V/D  MSK: No edema  SKIN: No new rashes  NEURO: No numbness, no tingling, no headaches    HEALTH MAINTENANCE  Health Maintenance Topics with due status: Overdue       Topic Date Due    Annual Physical Never done    Colorectal Cancer Screening Never done    Mammogram Never done    Pap Smear Never done    Pneumococcal Vaccine: 50+ Years Never done    Zoster Vaccines Never done    DEXA Scan Never done    COVID-19 Vaccine Never done    Influenza Vaccine Never done    Annual Depression Screening Never done    Fall Risk Screening (Annual) Never done       ALLERGIES  Allergies[1]    MEDICATIONS  Current Outpatient Medications   Medication Sig Dispense Refill    furosemide 40 MG Oral Tab Take 1 tablet (40 mg total) by mouth daily. 90 tablet 0    empagliflozin (JARDIANCE) 10 MG Oral Tab Take 1 tablet (10 mg total) by mouth daily. 90 tablet 0    losartan 100 MG Oral Tab Take 1 tablet (100 mg total) by mouth daily. 90 tablet 0       ACTIVE PROBLEMS  Patient Active Problem List   Diagnosis    Acute pain of left knee    Sinus congestion    Chronic diastolic heart failure (HCC)    History of cigarette smoking    Primary hypertension    Adrenal nodule (HCC)    Prediabetes    MARLENY (obstructive sleep apnea)    Thyroid nodule    Class 3 severe obesity due to excess calories with serious comorbidity and body mass index (BMI) of 40.0 to 44.9 in adult ([facility])    Health maintenance examination    Colon cancer screening    Rotator cuff tear       PAST MEDICAL HISTORY  Past Medical History:    Adrenal nodule (HCC)    Chronic diastolic heart failure (HCC)    MARLENY (obstructive sleep apnea)    Primary hypertension    Rotator cuff tear    Right    Uterine cancer (HCC)       PAST SOCIAL HISTORY  Social History     Socioeconomic History    Marital status:      Spouse name: Not on file    Number of children: Not on file    Years of education:  Not on file    Highest education level: Not on file   Occupational History    Not on file   Tobacco Use    Smoking status: Former     Current packs/day: 0.00     Average packs/day: 0.9 packs/day for 26.0 years (22.5 ttl pk-yrs)     Types: Cigarettes     Start date:      Quit date: 2016     Years since quittin.2    Smokeless tobacco: Never   Vaping Use    Vaping status: Never Used   Substance and Sexual Activity    Alcohol use: Not Currently    Drug use: Never    Sexual activity: Not Currently     Partners: Male   Other Topics Concern    Not on file   Social History Narrative    Relationships:  passed away     Children:     Pets:     School:     Work:     Origin:     Interests:     Spiritual:          Social Drivers of Health     Food Insecurity: Not on file   Transportation Needs: Not on file   Stress: Not on file   Housing Stability: Not on file       PAST SURGICAL HISTORY  Past Surgical History:   Procedure Laterality Date    Cholecystectomy      Excis bartholin gland/cyst      Knee arthroscopy Left 2025    left knee arthroscopy with partial medial meniscectomy and partial lateral meniscectomy    Total abdom hysterectomy         PAST FAMILY HISTORY  Family History   Problem Relation Age of Onset    Hypertension Mother     Diabetes Mother     Skin cancer Mother     Colon Cancer Father 50    Stroke Sister     Breast Cancer Sister     Cancer Sister         Thyroid    Uterine Cancer Sister     No Known Problems Maternal Grandmother     Stroke Maternal Grandfather     No Known Problems Paternal Grandmother     No Known Problems Paternal Grandfather          PHYSICAL EXAM  Vitals:    25 1217   BP: 128/76   Pulse: 84   Temp: 98.4 °F (36.9 °C)   SpO2: 98%   Weight: 249 lb (112.9 kg)   Height: 5' 5\" (1.651 m)      Body mass index is 41.44 kg/m².    GENERAL: NAD  HEENT: Moist mucous membranes, no tonsillar swelling or erythema, PERRLA bilat, TM translucent and non-bulging  NECK: Supple,  non-tender  RESP: Non-labored respirations, CTAB, no wheezing, no rales, no rhonchi  CV: RRR, no murmurs  GI: Soft, non-distended, non-tender, no guarding, no rebound, no masses  MSK: No edema  SKIN: Warm and dry, no rashes  NEURO: Answering questions appropriately    LABS  No results found for: \"WBC\", \"HGB\", \"HCT\", \"PLT\", \"NEPERCENT\", \"LYPERCENT\", \"MOPERCENT\", \"EOPERCENT\", \"BAPERCENT\", \"NE\", \"LYMABS\", \"MOABSO\", \"EOABSO\", \"BAABSO\", \"REITCPERCENT\"    No results found for: \"NA\", \"K\", \"CL\", \"CO2\", \"ANIONGAP\", \"BUN\", \"CREATSERUM\", \"BUNCREA\", \"GLU\", \"CA\", \"OSMOCALC\", \"GFRNAA\", \"GFRAA\", \"ALT\", \"AST\", \"ALKPHO\", \"BILT\", \"TP\", \"ALB\", \"GLOBULIN\", \"ELECTAG\", \"FASTING\"      No results found for: \"CHOLEST\", \"TRIG\", \"HDL\", \"LDL\", \"VLDL\", \"TCHDLRATIO\", \"NONHDLC\", \"CHOLHDLRATIO\", \"CALCNONHDL\"     DIAGNOSTICS      ASSESSMENT/PLAN  Problem List Items Addressed This Visit          HCC Problems    Adrenal nodule (HCC)     Patient with a left adrenal nodule seen on recent CT chest.  Will refer to endocrinology for further evaluation.         Relevant Medications    empagliflozin (JARDIANCE) 10 MG Oral Tab    Other Relevant Orders    ENDOCRINOLOGY - INTERNAL    Chronic diastolic heart failure (HCC)     Patient with a history of chronic diastolic heart failure and hypertension.  She used to follow with a cardiologist but now has new insurance.  Referral to a new cardiologist provided today.  Continue losartan 100 mg daily.  Continue Jardiance started by cardiology-can continue.  Continue furosemide 40 mg daily for swelling.         Relevant Medications    furosemide 40 MG Oral Tab    empagliflozin (JARDIANCE) 10 MG Oral Tab    losartan 100 MG Oral Tab    Other Relevant Orders    CARDIO - INTERNAL    Class 3 severe obesity due to excess calories with serious comorbidity and body mass index (BMI) of 40.0 to 44.9 in adult (HCC)     Patient with difficulty losing weight.  Check labs.  Will refer to weight specialist         Relevant  Medications    empagliflozin (JARDIANCE) 10 MG Oral Tab    Other Relevant Orders    BARIATRICS - INTERNAL    Comp Metabolic Panel (14)    Hemoglobin A1C    Lipid Panel    TSH W Reflex To Free T4       Cardiac and Vasculature    Primary hypertension - Primary     Patient with a history of chronic diastolic heart failure and hypertension.  She used to follow with a cardiologist but now has new insurance.  Referral to a new cardiologist provided today.  Continue losartan 100 mg daily.  Continue Jardiance started by cardiology-can continue.  Continue furosemide 40 mg daily for swelling.  Monitor CMP         Relevant Medications    furosemide 40 MG Oral Tab    losartan 100 MG Oral Tab       Endocrine and Metabolic    Prediabetes     Monitor hemoglobin A1c  Currently on Jardiance prescribed for history of heart failure         Relevant Medications    empagliflozin (JARDIANCE) 10 MG Oral Tab    losartan 100 MG Oral Tab    Other Relevant Orders    Hemoglobin A1C    Thyroid nodule     Patient with a history of thyroid nodules  They were FNA completed in December 2023 showing benign follicular nodules         Relevant Orders    TSH W Reflex To Free T4       Gastrointestinal and Abdominal    Colon cancer screening     Family history of colon cancer.  She reports she is due again for her colonoscopy - referral provided         Relevant Orders    GASTRO - INTERNAL       Musculoskeletal and Injuries    Acute pain of left knee     Patient with a history of left knee pain.  She completed a left knee arthroscopy in February 2025  Completed physical therapy  She reports that overall she is doing well at this time.         Rotator cuff tear     Patient with supraspinatus, infraspinatus and biceps tendon tears.  Has completed physical therapy  Will discuss more in the future-consider referral to shoulder specialist.            EarNoseThroat    Sinus congestion     Patient with recent sinus congestion.  She does not feel ill at this  time.  Consider environmental allergies  Advised trial of cetirizine and fluticasone nasal spray.            Sleep    MARLENY (obstructive sleep apnea)     Patient with obstructive sleep apnea  She recently had a polysomnography completed in 2025  She needs to see a sleep specialist-referral provided.         Relevant Orders    PULMONARY - INTERNAL       Tobacco    History of cigarette smoking     Last CT lung screen completed 2025 showing no interval lung nodules.  Will repeat in 1 year            Health Encounters    Health maintenance examination     Check labs  Follow up in 1 month for medicare visit         Relevant Orders    CBC With Differential With Platelet    Comp Metabolic Panel (14)    HCV Antibody    HIV Ag/Ab Combo    Hemoglobin A1C    Lipid Panel    TSH W Reflex To Free T4       Return in about 1 month (around 2025) for medicare visit.    No topic due editable text       Royce Aranda MD  Family Medicine           Pre-chartin minutes  Reviewing/obtainin minutes  Medical Exam:1 minutes  Counseling/education: 5 minutes  Notes: 5 minutes  Referring/communicatin minutes  Care coordination: 0 minutes    My total time spent caring for the patient on the day of the encounter: 43 minutes         [1] No Known Allergies

## 2025-03-26 ENCOUNTER — OFFICE VISIT (OUTPATIENT)
Dept: INTERNAL MEDICINE CLINIC | Facility: CLINIC | Age: 65
End: 2025-03-26
Payer: MEDICARE

## 2025-03-26 VITALS
TEMPERATURE: 98 F | OXYGEN SATURATION: 98 % | HEIGHT: 65 IN | SYSTOLIC BLOOD PRESSURE: 128 MMHG | BODY MASS INDEX: 41.48 KG/M2 | WEIGHT: 249 LBS | HEART RATE: 84 BPM | DIASTOLIC BLOOD PRESSURE: 76 MMHG

## 2025-03-26 DIAGNOSIS — E27.9 ADRENAL NODULE (HCC): ICD-10-CM

## 2025-03-26 DIAGNOSIS — R09.81 SINUS CONGESTION: ICD-10-CM

## 2025-03-26 DIAGNOSIS — Z87.891 HISTORY OF CIGARETTE SMOKING: ICD-10-CM

## 2025-03-26 DIAGNOSIS — I50.32 CHRONIC DIASTOLIC HEART FAILURE (HCC): ICD-10-CM

## 2025-03-26 DIAGNOSIS — R73.03 PREDIABETES: ICD-10-CM

## 2025-03-26 DIAGNOSIS — M75.121 COMPLETE TEAR OF RIGHT ROTATOR CUFF, UNSPECIFIED WHETHER TRAUMATIC: ICD-10-CM

## 2025-03-26 DIAGNOSIS — I10 PRIMARY HYPERTENSION: Primary | ICD-10-CM

## 2025-03-26 DIAGNOSIS — Z12.11 COLON CANCER SCREENING: ICD-10-CM

## 2025-03-26 DIAGNOSIS — M25.562 ACUTE PAIN OF LEFT KNEE: ICD-10-CM

## 2025-03-26 DIAGNOSIS — Z00.00 HEALTH MAINTENANCE EXAMINATION: ICD-10-CM

## 2025-03-26 DIAGNOSIS — E66.813 CLASS 3 SEVERE OBESITY DUE TO EXCESS CALORIES WITH SERIOUS COMORBIDITY AND BODY MASS INDEX (BMI) OF 40.0 TO 44.9 IN ADULT (HCC): ICD-10-CM

## 2025-03-26 DIAGNOSIS — G47.33 OSA (OBSTRUCTIVE SLEEP APNEA): ICD-10-CM

## 2025-03-26 DIAGNOSIS — E66.01 CLASS 3 SEVERE OBESITY DUE TO EXCESS CALORIES WITH SERIOUS COMORBIDITY AND BODY MASS INDEX (BMI) OF 40.0 TO 44.9 IN ADULT (HCC): ICD-10-CM

## 2025-03-26 DIAGNOSIS — E04.1 THYROID NODULE: ICD-10-CM

## 2025-03-26 PROCEDURE — 99499 UNLISTED E&M SERVICE: CPT | Performed by: FAMILY MEDICINE

## 2025-03-26 PROCEDURE — 99204 OFFICE O/P NEW MOD 45 MIN: CPT | Performed by: FAMILY MEDICINE

## 2025-03-26 PROCEDURE — G2211 COMPLEX E/M VISIT ADD ON: HCPCS | Performed by: FAMILY MEDICINE

## 2025-03-26 RX ORDER — LOSARTAN POTASSIUM 100 MG/1
100 TABLET ORAL DAILY
COMMUNITY
Start: 2025-02-12 | End: 2025-03-26

## 2025-03-26 RX ORDER — LOSARTAN POTASSIUM 100 MG/1
100 TABLET ORAL DAILY
Qty: 90 TABLET | Refills: 0 | Status: SHIPPED | OUTPATIENT
Start: 2025-03-26

## 2025-03-26 RX ORDER — FUROSEMIDE 40 MG/1
40 TABLET ORAL DAILY
COMMUNITY
Start: 2023-04-13 | End: 2025-03-26

## 2025-03-26 RX ORDER — FUROSEMIDE 40 MG/1
40 TABLET ORAL DAILY
Qty: 90 TABLET | Refills: 0 | Status: SHIPPED | OUTPATIENT
Start: 2025-03-26

## 2025-03-26 RX ORDER — EMPAGLIFLOZIN 10 MG/1
10 TABLET, FILM COATED ORAL EVERY MORNING
COMMUNITY
Start: 2025-01-02 | End: 2025-03-26

## 2025-03-26 NOTE — ASSESSMENT & PLAN NOTE
Attended community meeting. Updated on staffing and daily expectations. Shared goal for the day as to keep positive. Patient with a history of chronic diastolic heart failure and hypertension.  She used to follow with a cardiologist but now has new insurance.  Referral to a new cardiologist provided today.  Continue losartan 100 mg daily.  Continue Jardiance started by cardiology-can continue.  Continue furosemide 40 mg daily for swelling.

## 2025-03-26 NOTE — ASSESSMENT & PLAN NOTE
Patient with a history of thyroid nodules  They were FNA completed in December 2023 showing benign follicular nodules

## 2025-03-26 NOTE — ASSESSMENT & PLAN NOTE
Patient with a history of chronic diastolic heart failure and hypertension.  She used to follow with a cardiologist but now has new insurance.  Referral to a new cardiologist provided today.  Continue losartan 100 mg daily.  Continue Jardiance started by cardiology-can continue.  Continue furosemide 40 mg daily for swelling.  Monitor CMP

## 2025-03-26 NOTE — ASSESSMENT & PLAN NOTE
Patient with supraspinatus, infraspinatus and biceps tendon tears.  Has completed physical therapy  Will discuss more in the future-consider referral to shoulder specialist.

## 2025-03-26 NOTE — ASSESSMENT & PLAN NOTE
Patient with a history of left knee pain.  She completed a left knee arthroscopy in February 2025  Completed physical therapy  She reports that overall she is doing well at this time.

## 2025-03-26 NOTE — PATIENT INSTRUCTIONS
PATIENT INSTRUCTIONS    Thank you for seeing me today, it was a pleasure taking care of you.  Please check out at the  and schedule a follow up appointment.  Return in about 1 month (around 4/26/2025) for medicare visit.  Please remember that the preferred jonelle period for appointments is 5 minutes. This is to help maximize the amount of time that we can spend together at our visits.    Get labs drawn  CT lungs  Please call [phone] to schedule your imaging appointment.   See cardiology  See pulmonology - sleep apnea  See endocrinology - hormone specialist given history of adrenal nodule  See weight specialist  See GI - colonoscopy  Start cetirizine (zyrtec) daily - allergy  Start fluticasone nasal spray daily - allergy    [place],  Dr. Aranda    [place] LABS AND IMAGING INFORMATION    Here are some lab and imaging locations available to you. Please note that some of the times and availabilities are subject to change. Please refer to the frenting webpage for the most recent updates. There are also additional locations that can be found on the frenting webpage.    To schedule a lab appointment, please call [phone].    To schedule an imaging appointment, please call [phone], option 2      [address] [address]    Lab Hours  Monday - Friday 7:30am - 5pm  Saturday 6:30am - 3pm    X-ray Hours  Call [phone] for hours or to schedule      [facility]  [address] [place] [address]    Lab Hours  Monday - Friday 6:30am - 8pm  Saturday 6:30am - 3pm  Sunday 6:30am - 3pm    X-ray Hours  Call [phone] for hours or to schedule      [facility]  [address] [place] [address]    Lab Hours  Monday - Friday 7:30am - 5pm  Saturday 7:30am - 11:30am    X-ray Hours  None at this location      [facility] [place]  [address] [place] [address]    Lab Hours  Monday - Friday 8am - 12pm    X-ray Hours  Call [phone] for hours or to schedule      [facility] [place]  [address] [place] [address]    Lab Hours  Monday - Friday 7:30am - 5pm  Saturday 6:30am - 3pm    X-ray Hours  Call [phone] for hours or to schedule      [facility] [place]  [address]  [place] [address]    Lab Hours  Monday - Friday 7:30am - 4pm (closed 12:15pm - 1pm)    X-ray Hours  Call [phone] for hours or to schedule      [facility] [place]  [address]  [place] [address]    Lab Hours  Monday - Friday 8am - 4:30pm (closed 12:15pm - 1pm)    X-ray Hours  None at this location

## 2025-03-26 NOTE — ASSESSMENT & PLAN NOTE
Patient with obstructive sleep apnea  She recently had a polysomnography completed in March 2025  She needs to see a sleep specialist-referral provided.

## 2025-03-26 NOTE — ASSESSMENT & PLAN NOTE
Family history of colon cancer.  She reports she is due again for her colonoscopy - referral provided

## 2025-03-26 NOTE — ASSESSMENT & PLAN NOTE
Last CT lung screen completed January 31, 2025 showing no interval lung nodules.  Will repeat in 1 year

## 2025-03-26 NOTE — ASSESSMENT & PLAN NOTE
Patient with a left adrenal nodule seen on recent CT chest.  Will refer to endocrinology for further evaluation.

## 2025-03-26 NOTE — ASSESSMENT & PLAN NOTE
Patient with recent sinus congestion.  She does not feel ill at this time.  Consider environmental allergies  Advised trial of cetirizine and fluticasone nasal spray.

## 2025-03-29 ENCOUNTER — LAB ENCOUNTER (OUTPATIENT)
Dept: LAB | Age: 65
End: 2025-03-29
Attending: FAMILY MEDICINE
Payer: MEDICARE

## 2025-03-29 DIAGNOSIS — R73.03 PREDIABETES: ICD-10-CM

## 2025-03-29 DIAGNOSIS — E66.01 CLASS 3 SEVERE OBESITY DUE TO EXCESS CALORIES WITH SERIOUS COMORBIDITY AND BODY MASS INDEX (BMI) OF 40.0 TO 44.9 IN ADULT (HCC): ICD-10-CM

## 2025-03-29 DIAGNOSIS — E04.1 THYROID NODULE: ICD-10-CM

## 2025-03-29 DIAGNOSIS — Z00.00 HEALTH MAINTENANCE EXAMINATION: ICD-10-CM

## 2025-03-29 DIAGNOSIS — E66.813 CLASS 3 SEVERE OBESITY DUE TO EXCESS CALORIES WITH SERIOUS COMORBIDITY AND BODY MASS INDEX (BMI) OF 40.0 TO 44.9 IN ADULT (HCC): ICD-10-CM

## 2025-03-29 LAB
ALBUMIN SERPL-MCNC: 4.3 G/DL (ref 3.2–4.8)
ALBUMIN/GLOB SERPL: 1.7 {RATIO} (ref 1–2)
ALP LIVER SERPL-CCNC: 65 U/L
ALT SERPL-CCNC: 18 U/L
ANION GAP SERPL CALC-SCNC: 7 MMOL/L (ref 0–18)
AST SERPL-CCNC: 17 U/L (ref ?–34)
BASOPHILS # BLD AUTO: 0.06 X10(3) UL (ref 0–0.2)
BASOPHILS NFR BLD AUTO: 1.1 %
BILIRUB SERPL-MCNC: 1 MG/DL (ref 0.2–1.1)
BUN BLD-MCNC: 13 MG/DL (ref 9–23)
BUN/CREAT SERPL: 18.6 (ref 10–20)
CALCIUM BLD-MCNC: 9.1 MG/DL (ref 8.7–10.4)
CHLORIDE SERPL-SCNC: 107 MMOL/L (ref 98–112)
CHOLEST SERPL-MCNC: 190 MG/DL (ref ?–200)
CO2 SERPL-SCNC: 25 MMOL/L (ref 21–32)
CREAT BLD-MCNC: 0.7 MG/DL
DEPRECATED RDW RBC AUTO: 44.7 FL (ref 35.1–46.3)
EGFRCR SERPLBLD CKD-EPI 2021: 96 ML/MIN/1.73M2 (ref 60–?)
EOSINOPHIL # BLD AUTO: 0.2 X10(3) UL (ref 0–0.7)
EOSINOPHIL NFR BLD AUTO: 3.6 %
ERYTHROCYTE [DISTWIDTH] IN BLOOD BY AUTOMATED COUNT: 14.7 % (ref 11–15)
EST. AVERAGE GLUCOSE BLD GHB EST-MCNC: 120 MG/DL (ref 68–126)
FASTING PATIENT LIPID ANSWER: YES
FASTING STATUS PATIENT QL REPORTED: YES
GLOBULIN PLAS-MCNC: 2.5 G/DL (ref 2–3.5)
GLUCOSE BLD-MCNC: 93 MG/DL (ref 70–99)
HBA1C MFR BLD: 5.8 % (ref ?–5.7)
HCT VFR BLD AUTO: 36.9 %
HCV AB SERPL QL IA: NONREACTIVE
HDLC SERPL-MCNC: 53 MG/DL (ref 40–59)
HGB BLD-MCNC: 12 G/DL
IMM GRANULOCYTES # BLD AUTO: 0.01 X10(3) UL (ref 0–1)
IMM GRANULOCYTES NFR BLD: 0.2 %
LDLC SERPL CALC-MCNC: 114 MG/DL (ref ?–100)
LYMPHOCYTES # BLD AUTO: 1.87 X10(3) UL (ref 1–4)
LYMPHOCYTES NFR BLD AUTO: 33.3 %
MCH RBC QN AUTO: 27 PG (ref 26–34)
MCHC RBC AUTO-ENTMCNC: 32.5 G/DL (ref 31–37)
MCV RBC AUTO: 82.9 FL
MONOCYTES # BLD AUTO: 0.38 X10(3) UL (ref 0.1–1)
MONOCYTES NFR BLD AUTO: 6.8 %
NEUTROPHILS # BLD AUTO: 3.1 X10 (3) UL (ref 1.5–7.7)
NEUTROPHILS # BLD AUTO: 3.1 X10(3) UL (ref 1.5–7.7)
NEUTROPHILS NFR BLD AUTO: 55 %
NONHDLC SERPL-MCNC: 137 MG/DL (ref ?–130)
OSMOLALITY SERPL CALC.SUM OF ELEC: 288 MOSM/KG (ref 275–295)
PLATELET # BLD AUTO: 257 10(3)UL (ref 150–450)
POTASSIUM SERPL-SCNC: 3.9 MMOL/L (ref 3.5–5.1)
PROT SERPL-MCNC: 6.8 G/DL (ref 5.7–8.2)
RBC # BLD AUTO: 4.45 X10(6)UL
SODIUM SERPL-SCNC: 139 MMOL/L (ref 136–145)
T3FREE SERPL-MCNC: 2.97 PG/ML (ref 2.4–4.2)
T4 FREE SERPL-MCNC: 1.2 NG/DL (ref 0.8–1.7)
TRIGL SERPL-MCNC: 130 MG/DL (ref 30–149)
TSI SER-ACNC: 0.32 UIU/ML (ref 0.55–4.78)
VLDLC SERPL CALC-MCNC: 22 MG/DL (ref 0–30)
WBC # BLD AUTO: 5.6 X10(3) UL (ref 4–11)

## 2025-03-29 PROCEDURE — 86803 HEPATITIS C AB TEST: CPT

## 2025-03-29 PROCEDURE — 87389 HIV-1 AG W/HIV-1&-2 AB AG IA: CPT

## 2025-03-29 PROCEDURE — 84443 ASSAY THYROID STIM HORMONE: CPT

## 2025-03-29 PROCEDURE — 85025 COMPLETE CBC W/AUTO DIFF WBC: CPT

## 2025-03-29 PROCEDURE — 80053 COMPREHEN METABOLIC PANEL: CPT

## 2025-03-29 PROCEDURE — 80061 LIPID PANEL: CPT

## 2025-03-29 PROCEDURE — 84481 FREE ASSAY (FT-3): CPT

## 2025-03-29 PROCEDURE — 36415 COLL VENOUS BLD VENIPUNCTURE: CPT

## 2025-03-29 PROCEDURE — 83036 HEMOGLOBIN GLYCOSYLATED A1C: CPT

## 2025-03-29 PROCEDURE — 84439 ASSAY OF FREE THYROXINE: CPT

## 2025-04-04 ENCOUNTER — TELEPHONE (OUTPATIENT)
Facility: CLINIC | Age: 65
End: 2025-04-04

## 2025-04-04 ENCOUNTER — OFFICE VISIT (OUTPATIENT)
Facility: CLINIC | Age: 65
End: 2025-04-04

## 2025-04-04 VITALS
DIASTOLIC BLOOD PRESSURE: 82 MMHG | HEART RATE: 93 BPM | BODY MASS INDEX: 40.65 KG/M2 | SYSTOLIC BLOOD PRESSURE: 129 MMHG | WEIGHT: 244 LBS | HEIGHT: 65 IN

## 2025-04-04 DIAGNOSIS — Z86.0100 HISTORY OF COLON POLYPS: ICD-10-CM

## 2025-04-04 DIAGNOSIS — R19.7 DIARRHEA, UNSPECIFIED TYPE: Primary | ICD-10-CM

## 2025-04-04 DIAGNOSIS — Z80.0 FAMILY HISTORY OF COLON CANCER: ICD-10-CM

## 2025-04-04 DIAGNOSIS — Z80.0 FAMILY HISTORY OF COLON CANCER: Primary | ICD-10-CM

## 2025-04-04 PROCEDURE — 99204 OFFICE O/P NEW MOD 45 MIN: CPT | Performed by: NURSE PRACTITIONER

## 2025-04-04 NOTE — H&P
[facility] - Gastroenterology                                                                                                               Reason for consult: diarrhea    Requesting physician or provider: Royce Aranda MD    Chief Complaint   Patient presents with    Colonoscopy Screening      was last colonoscopy at [facility]; hx of polyps; family hx of colon cancer in father       HPI:   Kerry Grier is a 64 year old year-old female with medical history of MARLENY on CPAP, diastolic heart failure, hypertension.    She is here today for evaluation of diarrhea and screening colonoscopy order.  Father diagnosed with colon cancer in 50s,  at age 56.   also  of colon cancer.     Has been having watery diarrhea x1 week.   Started all night Monday.   The grandkids and parents were sick with similar symptoms prior to symptoms starting. Has been on bland diet, symptoms are improving now.   No vomiting. No blood in stool.       Last colonoscopy:  in [place], patient states was given 3 year recall, FORD filled out    NSAIDS: none  Tobacco: former   Alcohol: none  Marijuana: none  Illicit drugs: none    No history of adverse reaction to sedation  +MARLENY on cpap  No anticoagulants/antiplatelet  No pacemaker/defibrillator    Wt Readings from Last 6 Encounters:   25 244 lb (110.7 kg)   25 249 lb (112.9 kg)        History, Medications, Allergies, ROS:      Past Medical History:    Adrenal nodule (HCC)    Chronic diastolic heart failure (HCC)    MARLENY (obstructive sleep apnea)    Primary hypertension    Rotator cuff tear    Right    Uterine cancer (HCC)      Past Surgical History:   Procedure Laterality Date    Cholecystectomy      Colonoscopy      Excis bartholin gland/cyst      Knee arthroscopy Left 2025    left knee arthroscopy with partial medial meniscectomy and partial  lateral meniscectomy    Total abdom hysterectomy        Family Hx:   Family History   Problem Relation Age of Onset    Hypertension Mother     Diabetes Mother     Skin cancer Mother     Colon Cancer Father 50    Stroke Sister     Breast Cancer Sister     Cancer Sister         Thyroid    Uterine Cancer Sister     No Known Problems Maternal Grandmother     Stroke Maternal Grandfather     No Known Problems Paternal Grandmother     No Known Problems Paternal Grandfather       Social History:   Social History     Socioeconomic History    Marital status:    Tobacco Use    Smoking status: Former     Current packs/day: 0.00     Average packs/day: 0.9 packs/day for 26.0 years (22.5 ttl pk-yrs)     Types: Cigarettes     Start date:      Quit date: 2016     Years since quittin.2    Smokeless tobacco: Never   Vaping Use    Vaping status: Never Used   Substance and Sexual Activity    Alcohol use: Not Currently    Drug use: Never    Sexual activity: Not Currently     Partners: Male   Social History Narrative    Relationships:  passed away     Children:     Pets:     School:     Work:     Origin:     Interests:     Spiritual:             Medications (Active prior to today's visit):  Current Outpatient Medications   Medication Sig Dispense Refill    polyethylene glycol, PEG 3350-KCl-NaBcb-NaCl-NaSulf, 236 g Oral Recon Soln Take 4,000 mL by mouth As Directed. Take 2,000 mL the night before your procedure and 2,000 mL the morning of your procedure. 1 each 0    furosemide 40 MG Oral Tab Take 1 tablet (40 mg total) by mouth daily. 90 tablet 0    empagliflozin (JARDIANCE) 10 MG Oral Tab Take 1 tablet (10 mg total) by mouth daily. 90 tablet 0    losartan 100 MG Oral Tab Take 1 tablet (100 mg total) by mouth daily. 90 tablet 0       Allergies:  Allergies[1]    ROS:   CONSTITUTIONAL: negative for fevers, chills, sweats  EYES Negative for scleral icterus or redness, and diplopia  HEENT: Negative for  hoarseness  RESPIRATORY: Negative for cough and severe shortness of breath  CARDIOVASCULAR: Negative for crushing sub-sternal chest pain  GASTROINTESTINAL: See HPI  GENITOURINARY: Negative for dysuria  MUSCULOSKELETAL: Negative for arthralgias and myalgias  SKIN: Negative for jaundice, rash or pruritus  NEUROLOGICAL: Negative for dizziness and headaches  BEHAVIOR/PSYCH: Negative for psychotic behavior    PHYSICAL EXAM:   Blood pressure 129/82, pulse 93, height 5' 5\" (1.651 m), weight 244 lb (110.7 kg).    GEN: Alert, no acute distress, well-nourished   HEENT: anicteric sclera, neck supple, trachea midline, MMM, no palpable or tender neck or supraclavicular lymph nodes  CV: RRR, the extremities are warm and well perfused   LUNGS: No increased work of breathing, CTAB  ABDOMEN: Soft, symmetrical, non-tender without distention or guarding. No scars or lesions. Umbilicus is midline without herniation. Normoactive bowel sounds are present, No masses, hepatomegaly or splenomegaly noted.  MSK: No erythema, no warmth, no swelling of joints  SKIN: No jaundice, no erythema, no rashes, no lesions  HEMATOLOGIC: No bleeding, no bruising  NEURO: Alert and interactive, RAMIREZ  PSYCH: appropriate mood & affect    Labs/Imaging/Procedures:     Patient's pertinent labs and imaging were reviewed and discussed with patient today.        .  ASSESSMENT/PLAN:   Kerry Grier is a 64 year old year-old female with medical history of MARLENY on CPAP, diastolic heart failure, hypertension.    1. Diarrhea, unspecified type  - Stool Culture W/Shigatoxin; Future  - C. diff toxigenic PCR (OPT); Future    2. History of colon polyps    3. Family history of colon cancer    Advised that diarrhea is most likely viral. If does not resolve within a week will complete stool testing.  FORD signed for records from outside facility. CLN ordered.  Just established care with [facility], has echo pending. May need cardiac clearance prior to procedure.  Follow up pending  results.       Patient Instructions   -your diarrhea right now may be viral  - if does not resolve within the next week complete stool testing    --------------------------------------------------------------    1. Schedule colonoscopy with [facility] Endoscop[facility]  Diagnosis: family history of colon cancer, history of polyps  Sedation: MAC  Prep: split dose golytely    2. MEDICATION CHANGES PRIOR TO PROCEDURE    *HOLD jardiance for 4 days prior to procedure  7 days BEFORE procedure: *HOLD Iron pills, herbal supplements, multivitamins, or weight loss/diet medications (i.e.Phentermine/Vyvanse) or prasugrel (effient- antiplatelet)  DO NOT TAKE: Any form of alcohol, recreational drugs and any forms of erectile dysfunction medications 24 hours prior to procedure.  GI RNs please reach out to cardiology Dr Medellin at [facility], for cardiac clearance. Has echo pending.  To the patient: you are RESPONSIBLE for contacting your cardiologist to be sure blood thinner modifications are approved and no further cardiac testing is needed for cardiac clearance. Failure to obtain clearance can result in procedure cancellations.     3.  bowel prep from pharmacy   You can pick the bowel prep up now and store in a cool, dry place in your home until your scheduled bowel prep start date.    4. Read all bowel prep instructions carefully. Bowel prep instructions can also be found online at:  [email or web address]     5. AVOID seeds, nuts, popcorn, raw fruits and vegetables for 5 days before procedure    6. If you start any NEW medication after your visit today, please notify us. Certain medications (like iron or weight loss medications) will need to be held before the procedure, or the procedure cannot be performed safely.       Orders This Visit:  Orders Placed This Encounter   Procedures    Stool Culture W/Shigatoxin    C. diff toxigenic PCR (OPT)       Meds This Visit:  Requested Prescriptions     Signed Prescriptions Disp Refills     polyethylene glycol, PEG 3350-KCl-NaBcb-NaCl-NaSulf, 236 g Oral Recon Soln 1 each 0     Sig: Take 4,000 mL by mouth As Directed. Take 2,000 mL the night before your procedure and 2,000 mL the morning of your procedure.       Imaging & Referrals:  None      BRUNO Louise    [facility] Gastroenterology  4/4/2025               [1] No Known Allergies

## 2025-04-04 NOTE — TELEPHONE ENCOUNTER
Scheduled for: Colonoscopy [address]861    Provider Name:  Dr Forrester    Date:  6/24/2025     Location:    [facility]    Sedation:  MAC    Time:  9:30 am (Patient made aware EM will call the day before with procedure/arrival time)    Prep:  Golytely    Meds/Allergies Reconciled?:  Physician reviewed     Diagnosis with codes:    Family history of colon cancer [Z80.0]  History of colon polyps [Z86.0100     Was patient informed to call insurance with codes (Y/N):  Yes, I confirmed Medicare insurance with the patient.     Referral sent?:  N/A    EM or [facility] notified?:  I sent an electronic request to Endo Scheduling and received a confirmation today.      Medication Orders:  This patient verbally confirmed that she is not taking:    Iron, blood thinners, BP meds, and is diabetic    No latex allergy, No PCN allergy and does not have a pacemaker     Misc Orders:    Hold Jardiance 4 days prior to the procedure     Further instructions given by staff:   I discussed the prep instructions with the patient which she verbally understood and is aware that I will send the instructions today via [employer].    Advised patient:    You will not be able to drive, operate machinery or make critical decisions the day of your procedure. Please make arrangements for transportation. You must have a  (age 18 or older) to accompany you, stay in the facility for the duration of your procedure and drive you home after the procedure.  You cannot use public transportation (Uber, Lyft, Taxi). The procedure involves sedation, and you will not be allowed to leave unaccompanied. Your procedure will not proceed forward if you're unable to confirm your  planned to escort you home.    Advised Patient:    [facility] requires payment of copay and any patient responsibility at the time of registration.   The [facility] requires copay and 50% of the patient responsibility at the time of service for all Esophagogastroduodenoscopy and diagnostic Colonoscopies.     They  do offer payment plans and Care Credit options if unable to pay the full amount at the time of registration.     If you have any questions regarding your potential responsibility, please contact [facility] Insurance Department at [phone] option 1.    You may receive 4 bills related to your medical procedure:   [facility] (the facility)  The procedural physician  The anesthesiologist  The pathology lab (if applicable)

## 2025-04-04 NOTE — TELEPHONE ENCOUNTER
Patient was seen in office today. Patient was provided prep instruction sheet. Informed patient he/she will receive a phone call to schedule procedure(s) and he/she verbalized understanding. Please schedule patient per below orders. Thank you!       Orders:  1. Schedule colonoscopy with [facility] Endoscopist  Diagnosis: family history of colon cancer, history of polyps  Sedation: MAC  Prep: split dose golytely     2. MEDICATION CHANGES PRIOR TO PROCEDURE     *HOLD jardiance for 4 days prior to procedure  7 days BEFORE procedure: *HOLD Iron pills, herbal supplements, multivitamins, or weight loss/diet medications (i.e.Phentermine/Vyvanse) or prasugrel (effient- antiplatelet)  DO NOT TAKE: Any form of alcohol, recreational drugs and any forms of erectile dysfunction medications 24 hours prior to procedure.  GI RNs please reach out to cardiology Dr Medellin at [facility], for cardiac clearance. Has echo pending.  To the patient: you are RESPONSIBLE for contacting your cardiologist to be sure blood thinner modifications are approved and no further cardiac testing is needed for cardiac clearance. Failure to obtain clearance can result in procedure cancellations.      3.  bowel prep from pharmacy   You can pick the bowel prep up now and store in a cool, dry place in your home until your scheduled bowel prep start date.     4. Read all bowel prep instructions carefully. Bowel prep instructions can also be found online at:  www.[email or web address]      5. AVOID seeds, nuts, popcorn, raw fruits and vegetables for 5 days before procedure     6. If you start any NEW medication after your visit today, please notify us. Certain medications (like iron or weight loss medications) will need to be held before the procedure, or the procedure cannot be performed safely.

## 2025-04-04 NOTE — PATIENT INSTRUCTIONS
-your diarrhea right now may be viral  - if does not resolve within the next week complete stool testing    --------------------------------------------------------------    1. Schedule colonoscopy with [facility] Endoscopist  Diagnosis: family history of colon cancer, history of polyps  Sedation: MAC  Prep: split dose golytely    2. MEDICATION CHANGES PRIOR TO PROCEDURE    *HOLD jardiance for 4 days prior to procedure  7 days BEFORE procedure: *HOLD Iron pills, herbal supplements, multivitamins, or weight loss/diet medications (i.e.Phentermine/Vyvanse) or prasugrel (effient- antiplatelet)  DO NOT TAKE: Any form of alcohol, recreational drugs and any forms of erectile dysfunction medications 24 hours prior to procedure.  GI RNs please reach out to cardiology Dr Medellin at [facility], for cardiac clearance. Has echo pending.  To the patient: you are RESPONSIBLE for contacting your cardiologist to be sure blood thinner modifications are approved and no further cardiac testing is needed for cardiac clearance. Failure to obtain clearance can result in procedure cancellations.     3.  bowel prep from pharmacy   You can pick the bowel prep up now and store in a cool, dry place in your home until your scheduled bowel prep start date.    4. Read all bowel prep instructions carefully. Bowel prep instructions can also be found online at:  www.[email or web address]/giprep     5. AVOID seeds, nuts, popcorn, raw fruits and vegetables for 5 days before procedure    6. If you start any NEW medication after your visit today, please notify us. Certain medications (like iron or weight loss medications) will need to be held before the procedure, or the procedure cannot be performed safely.

## 2025-04-04 NOTE — TELEPHONE ENCOUNTER
Signed FORD form faxed to [facility] for patient's last colonoscopy report in 2021.     Fax: [phone]  Phone: [phone]    FORD form sent to scan.

## 2025-04-15 ENCOUNTER — HOSPITAL ENCOUNTER (OUTPATIENT)
Age: 65
Discharge: HOME | End: 2025-04-15
Payer: MEDICARE

## 2025-04-15 ENCOUNTER — TELEPHONE (OUTPATIENT)
Dept: INTERNAL MEDICINE CLINIC | Facility: CLINIC | Age: 65
End: 2025-04-15

## 2025-04-15 ENCOUNTER — APPOINTMENT (OUTPATIENT)
Dept: GENERAL RADIOLOGY | Age: 65
End: 2025-04-15
Payer: MEDICARE

## 2025-04-15 VITALS
DIASTOLIC BLOOD PRESSURE: 65 MMHG | SYSTOLIC BLOOD PRESSURE: 130 MMHG | RESPIRATION RATE: 20 BRPM | HEART RATE: 87 BPM | OXYGEN SATURATION: 98 % | TEMPERATURE: 98 F

## 2025-04-15 DIAGNOSIS — R05.8 PRODUCTIVE COUGH: Primary | ICD-10-CM

## 2025-04-15 PROCEDURE — 99214 OFFICE O/P EST MOD 30 MIN: CPT

## 2025-04-15 PROCEDURE — 94640 AIRWAY INHALATION TREATMENT: CPT

## 2025-04-15 PROCEDURE — 99204 OFFICE O/P NEW MOD 45 MIN: CPT

## 2025-04-15 PROCEDURE — 71046 X-RAY EXAM CHEST 2 VIEWS: CPT

## 2025-04-15 RX ORDER — ALBUTEROL SULFATE 90 UG/1
2 INHALANT RESPIRATORY (INHALATION) ONCE
Status: COMPLETED | OUTPATIENT
Start: 2025-04-15 | End: 2025-04-15

## 2025-04-15 RX ORDER — ALBUTEROL SULFATE 90 UG/1
2 INHALANT RESPIRATORY (INHALATION) EVERY 4 HOURS PRN
Qty: 1 EACH | Refills: 0 | Status: SHIPPED | OUTPATIENT
Start: 2025-04-15 | End: 2025-05-15

## 2025-04-15 RX ORDER — BENZONATATE 100 MG/1
100 CAPSULE ORAL 3 TIMES DAILY PRN
Qty: 30 CAPSULE | Refills: 0 | Status: SHIPPED | OUTPATIENT
Start: 2025-04-15 | End: 2025-05-15

## 2025-04-15 NOTE — TELEPHONE ENCOUNTER
Patient called and is asking for a call back from the nurse.    She said she still has allergy symptoms, coughing, phlegm.    Patient is going on vacation and wants to get well before she goes.

## 2025-04-15 NOTE — DISCHARGE INSTRUCTIONS
Chest x-ray shows normal.  Because of the duration of your symptoms, I will treat you for the productive cough/sinusitis.  Please take the antibiotics as prescribed.  I also sent a prescription for Tessalon Perles for the cough.  You can also continue to use the albuterol at home which might help the spasticity of the cough.    You can use Flonase, Mucinex and Claritin for congestion.  You can use tylenol or motrin for pain or fever.   If you develop a fever that does not improve with medication, chest pain, shortness of breath or any other concerning complaints you should go to the emergency department.  Otherwise follow-up with your primary care doctor next week.

## 2025-04-15 NOTE — ED PROVIDER NOTES
Patient Seen in: [facility]      History     Chief Complaint   Patient presents with    Cough/URI     Stated Complaint: sore throat  Subjective:   Kerry is a 64-year-old female presenting to the immediate care complaining of cough, congestion, rhinorrhea, postnasal drip and sore throat.  Patient states that her sore throat started a couple of weeks ago and then it turned into the cough, congestion, postnasal drip.  States that she has had persistent symptoms for the past couple of weeks.  She also has some sinus congestion and pressure.  Denies any episodes of respiratory distress or severe difficulty breathing.  She denies any fever, chest pain, shortness of breath, abdominal pain or vomiting.  No leg swelling.  She is eating and drinking well and is well-hydrated.  She denies any other concerns or complaints.        Objective:   Past Medical History:    Adrenal nodule (HCC)    Chronic diastolic heart failure (HCC)    MARLENY (obstructive sleep apnea)    Primary hypertension    Rotator cuff tear    Right    Uterine cancer (HCC)            Past Surgical History:   Procedure Laterality Date    Cholecystectomy      Colonoscopy      Excis bartholin gland/cyst      Knee arthroscopy Left 2025    left knee arthroscopy with partial medial meniscectomy and partial lateral meniscectomy    Total abdom hysterectomy                Social History     Socioeconomic History    Marital status:    Tobacco Use    Smoking status: Former     Current packs/day: 0.00     Average packs/day: 0.9 packs/day for 26.0 years (22.5 ttl pk-yrs)     Types: Cigarettes     Start date:      Quit date: 2016     Years since quittin.2    Smokeless tobacco: Never   Vaping Use    Vaping status: Never Used   Substance and Sexual Activity    Alcohol use: Not Currently    Drug use: Never    Sexual activity: Not Currently     Partners: Male   Social History Narrative    Relationships:  passed away     Children:      Pets:     School:     Work:     Origin:     Interests:     Spiritual:                 Review of Systems    Positive for stated complaint: Cough/URI    Other systems are as noted in HPI.  Constitutional and vital signs reviewed.      All other systems reviewed and negative except as noted above.    Physical Exam     ED Triage Vitals [04/15/25 1622]   /65   Pulse 87   Resp 20   Temp 98.1 °F (36.7 °C)   Temp src Oral   SpO2 98 %   O2 Device None (Room air)     Current:/65   Pulse 87   Temp 98.1 °F (36.7 °C) (Oral)   Resp 20   SpO2 98%     Physical Exam  Vitals and nursing note reviewed.   Constitutional:       General: She is not in acute distress.     Appearance: Normal appearance. She is not ill-appearing, toxic-appearing or diaphoretic.   HENT:      Head: Normocephalic.      Right Ear: Ear canal and external ear normal. A middle ear effusion is present.      Left Ear: Tympanic membrane, ear canal and external ear normal.      Nose: Congestion and rhinorrhea present.      Mouth/Throat:      Mouth: Mucous membranes are moist.      Pharynx: Oropharynx is clear.   Eyes:      Conjunctiva/sclera: Conjunctivae normal.   Cardiovascular:      Rate and Rhythm: Normal rate and regular rhythm.      Pulses: Normal pulses.      Heart sounds: Normal heart sounds.   Pulmonary:      Effort: Pulmonary effort is normal. No tachypnea, bradypnea, accessory muscle usage, prolonged expiration, respiratory distress or retractions.      Breath sounds: Normal air entry. No stridor, decreased air movement or transmitted upper airway sounds. Decreased breath sounds present. No wheezing, rhonchi or rales.      Comments: Mildly diminished breath sounds throughout all lung fields.  Improved after albuterol.  Abdominal:      General: Abdomen is flat.   Musculoskeletal:         General: Normal range of motion.      Cervical back: Normal range of motion.      Right lower leg: No edema.      Left lower leg: No edema.   Skin:      General: Skin is warm.      Capillary Refill: Capillary refill takes less than 2 seconds.   Neurological:      General: No focal deficit present.      Mental Status: She is alert and oriented to person, place, and time.   Psychiatric:         Mood and Affect: Mood normal.         Behavior: Behavior normal.         Thought Content: Thought content normal.         Judgment: Judgment normal.         ED Course   Radiology:    XR CHEST PA + LAT CHEST (CPT=71046)   Final Result   PROCEDURE: XR CHEST PA + LAT CHEST (CPT=71046)       COMPARISON: None.       INDICATIONS: Sore throat and productive cough.       TECHNIQUE:   Two views.         FINDINGS:    CARDIAC/VASC: Heart size and pulmonary vascularity normal.    MEDIAST/DELPHINE:   No visible mass or adenopathy.   LUNGS/PLEURA: No consolidation or pleural effusion.   BONES: No fracture or visible bony lesion. Degenerative changes in spine.   OTHER: Negative.                     =====   CONCLUSION:    1. No acute cardiopulmonary finding.               Dictated by (CST): Henry Segura MD on 4/15/2025 at 4:49 PM        Finalized by (CST): Henry Segura MD on 4/15/2025 at 4:50 PM                 Labs Reviewed - No data to display    MDM     Medical Decision Making  Differential diagnoses reflecting the complexity of care include but are not limited to viral versus bacterial etiology of upper respiratory complaints.    Comorbidities that add complexity to management include: Hypertension, sleep apnea, CHF  History obtained by an independent source was from: Patient  My independent interpretations of studies include: X-ray  Patient is well appearing, non-toxic and in no acute distress.  Vital signs are stable.     Given the duration of illness there is minimal utility to COVID or influenza testing as it would not .  History and physical exam are consistent with sinusitis/productive cough.  Chest x-ray did not show any signs of pneumonia.  Patient received an  albuterol inhaler at the immediate care with some relief of the cough symptoms.  Sent a prescription for Augmentin to treat productive cough/sinusitis.  Also sent a prescription for Tessalon Perles for the cough.  Sent a prescription for albuterol to be used at home as needed.  Recommended that if she needs to butyryl more than every 4 hours she go to the emergency department.  Recommended that patient drink plenty of fluids, use Tylenol and Motrin for pain or fever, use Flonase and Mucinex for nasal congestion and may use over-the-counter medications for congestion as needed.  Also recommended using saline rinses/Laura pot for nasal congestion.  Recommended that if the patient develops any chest pain, respiratory complaints, severe headache, fever that does not improve with medications or any other concerning complaints they should go to the emergency department.    ED precautions discussed.  Patient (guardian) advised to follow up with PCP in 2-3 days.  Patient (guardian) agrees with this plan of care.  Patient (guardian) verbalizes understanding of discharge instructions and plan of care.      Amount and/or Complexity of Data Reviewed  Radiology: ordered and independent interpretation performed. Decision-making details documented in ED Course.    Risk  OTC drugs.  Prescription drug management.        Disposition and Plan     Clinical Impression:  1. Productive cough         Disposition:  Discharge  4/15/2025  5:10 pm    Follow-up:  Royce Aranda MD  [address] [place] [id number]  [phone]                Medications Prescribed:  Discharge Medication List as of 4/15/2025  5:12 PM        START taking these medications    Details   amoxicillin clavulanate 875-125 MG Oral Tab Take 1 tablet by mouth 2 (two) times daily for 10 days., Normal, Disp-20 tablet, R-0      benzonatate 100 MG Oral Cap Take 1 capsule (100 mg total) by mouth 3 (three) times daily as needed for cough., Normal, Disp-30 capsule, R-0       albuterol 108 (90 Base) MCG/ACT Inhalation Aero Soln Inhale 2 puffs into the lungs every 4 (four) hours as needed for Wheezing., Normal, Disp-1 each, R-0

## 2025-05-12 ENCOUNTER — TELEPHONE (OUTPATIENT)
Facility: CLINIC | Age: 65
End: 2025-05-12

## 2025-05-12 NOTE — TELEPHONE ENCOUNTER
1st reminder letter sent out via My Chart for the following:    Stool Culture W/Shigatoxin (Order #[id number]) on 4/4/25     C. diff toxigenic PCR (OPT) (Order #[id number]) on 4/4/25

## 2025-06-04 ENCOUNTER — OFFICE VISIT (OUTPATIENT)
Dept: INTERNAL MEDICINE CLINIC | Facility: CLINIC | Age: 65
End: 2025-06-04
Payer: MEDICARE

## 2025-06-04 ENCOUNTER — TELEPHONE (OUTPATIENT)
Facility: CLINIC | Age: 65
End: 2025-06-04

## 2025-06-04 VITALS
OXYGEN SATURATION: 98 % | WEIGHT: 249 LBS | DIASTOLIC BLOOD PRESSURE: 84 MMHG | BODY MASS INDEX: 41.48 KG/M2 | HEART RATE: 90 BPM | SYSTOLIC BLOOD PRESSURE: 128 MMHG | TEMPERATURE: 98 F | HEIGHT: 65 IN

## 2025-06-04 DIAGNOSIS — M25.562 ACUTE PAIN OF LEFT KNEE: ICD-10-CM

## 2025-06-04 DIAGNOSIS — E05.90 SUBCLINICAL HYPERTHYROIDISM: ICD-10-CM

## 2025-06-04 DIAGNOSIS — M79.661 PAIN IN RIGHT SHIN: ICD-10-CM

## 2025-06-04 DIAGNOSIS — E27.9 ADRENAL NODULE (HCC): ICD-10-CM

## 2025-06-04 DIAGNOSIS — E04.1 THYROID NODULE: ICD-10-CM

## 2025-06-04 DIAGNOSIS — Z80.0 FAMILY HISTORY OF COLON CANCER: Primary | ICD-10-CM

## 2025-06-04 DIAGNOSIS — Z87.891 HISTORY OF CIGARETTE SMOKING: ICD-10-CM

## 2025-06-04 DIAGNOSIS — R73.03 PREDIABETES: ICD-10-CM

## 2025-06-04 DIAGNOSIS — I10 PRIMARY HYPERTENSION: ICD-10-CM

## 2025-06-04 DIAGNOSIS — I50.32 CHRONIC DIASTOLIC HEART FAILURE (HCC): ICD-10-CM

## 2025-06-04 DIAGNOSIS — G47.33 OSA (OBSTRUCTIVE SLEEP APNEA): ICD-10-CM

## 2025-06-04 DIAGNOSIS — M25.561 ACUTE PAIN OF RIGHT KNEE: ICD-10-CM

## 2025-06-04 DIAGNOSIS — Z12.31 SCREENING MAMMOGRAM FOR BREAST CANCER: ICD-10-CM

## 2025-06-04 DIAGNOSIS — M75.121 COMPLETE TEAR OF RIGHT ROTATOR CUFF, UNSPECIFIED WHETHER TRAUMATIC: ICD-10-CM

## 2025-06-04 DIAGNOSIS — Z78.0 ASYMPTOMATIC MENOPAUSAL STATE: ICD-10-CM

## 2025-06-04 DIAGNOSIS — E78.5 DYSLIPIDEMIA: ICD-10-CM

## 2025-06-04 DIAGNOSIS — Z86.0100 HISTORY OF COLON POLYPS: ICD-10-CM

## 2025-06-04 DIAGNOSIS — Z00.00 HEALTH MAINTENANCE EXAMINATION: Primary | ICD-10-CM

## 2025-06-04 DIAGNOSIS — E66.813 CLASS 3 SEVERE OBESITY DUE TO EXCESS CALORIES WITH SERIOUS COMORBIDITY AND BODY MASS INDEX (BMI) OF 40.0 TO 44.9 IN ADULT: ICD-10-CM

## 2025-06-04 DIAGNOSIS — Z12.11 COLON CANCER SCREENING: ICD-10-CM

## 2025-06-04 PROBLEM — R09.81 SINUS CONGESTION: Status: RESOLVED | Noted: 2025-03-26 | Resolved: 2025-06-04

## 2025-06-04 PROCEDURE — G0402 INITIAL PREVENTIVE EXAM: HCPCS | Performed by: FAMILY MEDICINE

## 2025-06-04 NOTE — ASSESSMENT & PLAN NOTE
Patient with a left adrenal nodule seen on recent CT chest.  Has plans to see endocrinology for further evaluation

## 2025-06-04 NOTE — ASSESSMENT & PLAN NOTE
Patient with acute pain of the right knee as well as the right shin.  She reports that this gradually occurred while she was in [place] after doing a lot of walking.  No specific trauma or injury.  She is significantly tender to palpation throughout the anterior knee and in the region of the proximal tibia.  She states that she had an x-ray of her knee that was completed in [place] that showed arthritic changes.  I will order an x-ray of her tibia and fibula given the tenderness throughout her right shin.  MRI ordered of the right knee.  She is using Poltram (tramadol and acetaminophen) that she received in [place]-she has 60 tablets of th this.  Referral to physical therapy provided.  Avoid strenuous activity.  Consider referral to orthopedic surgery moving forward.

## 2025-06-04 NOTE — ASSESSMENT & PLAN NOTE
Patient with a history of thyroid nodules  FNA completed in December 2023 showing benign follicular nodules

## 2025-06-04 NOTE — ASSESSMENT & PLAN NOTE
Patient with supraspinatus, infraspinatus and biceps tendon tears.  Has completed physical therapy  Declines seeing shoulder specialist for now - wants to focus on the knee

## 2025-06-04 NOTE — TELEPHONE ENCOUNTER
Urgent fax sent to Dr. Medellin/DANII requesting cardiac clearance prior to procedure dino 6/24/25.

## 2025-06-04 NOTE — ASSESSMENT & PLAN NOTE
Patient with a history of chronic diastolic heart failure and hypertension.  Blood pressures are controlled.   Continue losartan 100 mg daily.

## 2025-06-04 NOTE — TELEPHONE ENCOUNTER
RN called pt with polish  [id number]. Pt states she does not need an  so proceeded in english. Informed pt that a request for cardiac clearance was faxed to cardiology office today. Pt verbalized understanding.

## 2025-06-04 NOTE — ASSESSMENT & PLAN NOTE
Patient with obstructive sleep apnea  She recently had a polysomnography completed in March 2025  Referred to sleep specialist

## 2025-06-04 NOTE — ASSESSMENT & PLAN NOTE
Patient with difficulty losing weight.  She does not want to see a dietician  Referred to weight specialist

## 2025-06-04 NOTE — ASSESSMENT & PLAN NOTE
Patient with a history of chronic diastolic heart failure and hypertension.  Seeing a new cardiologist now  Continue losartan 100 mg daily.  Continue Jardiance started by cardiology-can continue.  Continue furosemide 40 mg daily for swelling.

## 2025-06-04 NOTE — TELEPHONE ENCOUNTER
Patient says she needs cardiology clearance prior to her procedure on 6/24. Patient requesting for office to fax clearance form to [phone], Dr. Lacie MD with [facility]. Patient says she has an appointment with him on 6/24, but needs to see him sooner for clearance. Patient asking if our office can help in getting her to be seen sooner. Please update patient at [phone],thanks.

## 2025-06-04 NOTE — PATIENT INSTRUCTIONS
PATIENT INSTRUCTIONS    Thank you for seeing me today, it was a pleasure taking care of you.  Please check out at the  and schedule a follow up appointment.  Return in about 1 year (around 6/4/2026) for medicare visit.  Please remember that the preferred jonelle period for appointments is 5 minutes. This is to help maximize the amount of time that we can spend together at our visits.    The following imaging studies were ordered: X-ray of your leg, CT lung - December or January per your prefernce, CT calcium score - heart, DEXA bone density, mammogram, MRI knee  Please also follow up with the following specialists: Cardiology, endocrinology, weight, pulmonology  Please fill out the advance directive information (power of  documents) and bring a copy to our clinic.  Physical therapy for knee  Shingles vaccine at the pharmacy   Get the me date and type of pneumonia vaccine you received  When able, GI - colonoscopy     [place],   Dr. Manoj BELTRAN LABS AND IMAGING INFORMATION    Here are some lab and imaging locations available to you. Please note that some of the times and availabilities are subject to change. Please refer to the Submittable webpage for the most recent updates. There are also additional locations that can be found on the Submittable webpage.    To schedule a lab appointment, please call [phone].    To schedule an imaging appointment, please call [phone], option 2      [address] [address]    Lab Hours  Monday - Friday 7:30am - 5pm  Saturday 6:30am - 3pm    X-ray Hours  Call [phone] for hours or to schedule      [facility]  [address] [place] [address]    Lab Hours  Monday - Friday 6:30am - 8pm  Saturday 6:30am - 3pm  Sunday 6:30am - 3pm    X-ray Hours  Call [phone] for hours or to schedule      [facility]  [address]  [address] [place] [address]    Lab Hours  Monday - Friday 7:30am - 5pm  Saturday 7:30am - 11:30am    X-ray Hours  None at this location      [facility] [address] [address]    Lab Hours  Monday - Friday 8am - 12pm    X-ray Hours  Call [phone] for hours or to schedule      [facility] [place]  [address] [place] [address]    Lab Hours  Monday - Friday 7:30am - 5pm  Saturday 6:30am - 3pm    X-ray Hours  Call [phone] for hours or to schedule      [facility] [place]  [address]  [address]  [place] [address]    Lab Hours  Monday - Friday 7:30am - 4pm (closed 12:15pm - 1pm)    X-ray Hours  Call [phone] for hours or to schedule      [facility] [place] [place]  [address]  [address]  [place] [address]    Lab Hours  Monday - Friday 8am - 4:30pm (closed 12:15pm - 1pm)    X-ray Hours  None at this location

## 2025-06-04 NOTE — PROGRESS NOTES
FAMILY MEDICINE CLINIC NOTE - MEDICARE    HPI  Kerry Grier is a 64 year old female presenting for a Initial Annual Wellness Visit (outside the first 12 months of Medicare eligibility, no prior AWV).    #Health Maintenance  -Diet: Eating healthy  -Exercise: Walked a lot in [place]  -Lung cancer screen: Indicated  CT lung screen 1/31/25 - 22.5 pack year history, quit in 2016 - needs thru 2031  -Colon cancer screen: Indicated - has plans for GI for colonoscopy  -Statin:  - 3/2025  -ASA: Not indicated  -Breast cancer screen: Indicated  -Breast cancer medication to reduce risk: Declines   -Periods: Menopause  -Cervical cancer screen: - Hysterectomy  -DEXA: Indicated  -BRCA: Not indicated  -Intimate partner violence: Denies abuse  -HIV screen: - 3/2025 negative  -Hep C screen: - 3/2025 negative  -Gonorrhea/chlamydia:  Not Indicated  -Syphillis: Not indicated  -TB: Not indicated  -Tobacco/alcohol: Per below      #Immunizations  -Tdap: Medicare  -Flu shot: Not indicated - not season  -PCV13: Not indicated   -PCV20: reports received   -PPSV23: Not indicated   -RZV (preferred) or VZL: Indicated   -RSV: Optional   -COVID: Indicated      #R knee pain  -was walking a lot in [place]   -started 4 weeks ago  -gradual worsening  -anterior aspect of the knee painful  -also pain at the right shin  -no trauma or injury  -not locking, no clicking  -no fever/chills  -saw ortho in [place]  -reports had x-ray - she believes had arthritis, inflammation and swelling  -poltram combo 37.5 +325 mg (tramadol and acetaminophen)    #L knee pain  -left knee arthroscopy with partial medial meniscectomy and partial lateral meniscectomy 2/4/25  -ortho Dr Ulysses Mohr  -done with physical therapy           #MARLENY  -CPAP  -polysomnography 3/2025  -not currently seeing a specialist      #Chronic diastolic heart failure  #HTN  -cardiology Dr Sharp  -yuediance 10 mg daily started by cardiology   -losartan 100 mg daily  -furosemide 40 mg daily   -no CP,  no SOB  -does not want to be on statin    #Prediabetes  -A1c 6.1  -jardiance for heart failure history     #Obesity  -difficulty losing weight  -she reports she is not eating very much  -notes family history - sister with thyroid cancer  -has plans to see Dr Sabillon      #Thyroid nodules  -FNA 12/2023 - benign follicular nodules (left lower medial, left lower lateral)     #Subclinical hyperthyroidism  -TSH 3/2025    #Left adrenal nodule  -seen on prior CT chest 1/31/25 - left adrenal nodule 2.16 x 1.93 cm, [facility]ounsf[facility] unit as low as 1.32.   -has appointment to see endocrinology Dr Jacob     #History of cigarette smoker  -last CT lung screen 1/31/25 - no interval lung nodules  -will repeat CT chest     #Rotator cuff injury  -R shoulder  -MRI 8/14/24.  1.  Full-thickness retracted tear of the supraspinatus tendon.  2.  Moderate infraspinatus tendon without high-grade tear.  3.  Severe tendinosis and probable high-grade tear of the biceps long head tendon.  4.  Mild to moderate glenohumeral osteoarthrosis.  5.  Moderate effusion.  6.  Multiple loose bones in the subdeltoid bursa measuring up to 1.3 cm.   -has done physical therapy   -reports some improvement    #History of uterine cancer  -2014 history of hysterectomy    #Dyslipidemia  -check CT calcium score      #Additional screenings  History/Other:   Fall Risk Assessment:   She has been screened for Falls and is low risk.      Cognitive Assessment:   She had a completely normal cognitive assessment - see flowsheet entries       Functional Ability/Status:   Kerry Grier has some abnormal functions as listed below:  She has Vision problems based on screening of functional status. She has Walking problems based on screening of functional status.       Depression Screening (PHQ-2/PHQ-9): PHQ-2 SCORE: 0  , done 6/4/2025        Advanced Directives:   She does have a Living Will but we do NOT have it on file in Epic.    She does NOT have a Power of  for  Health Care. [Do you have a healthcare power of ?: No]  Discussed Advance Care Planning with patient (and family/surrogate if present). Standard forms made available to patient in After Visit Summary.    #Patient Care Team  Patient Care Team:  Royce Aranda MD as PCP - General (Family Medicine)      ROS  GENERAL: No fever/chills, no recent weight loss   HEENT: No visual changes, no changes in hearing, no sore throats  NECK: No pain, no swelling  RESP: No cough, no SOB  CV: No chest pain, no palpitations  GI: No abd pain, no N/V/D  MSK: No edema, + pain  SKIN: No new rashes  NEURO: No numbness, no tingling, no HA    HEALTH MAINTENANCE CHECKLIST  Health Maintenance Topics with due status: Overdue       Topic Date Due    Colorectal Cancer Screening Never done    Annual Physical Never done    Mammogram Never done    Pneumococcal Vaccine: 50+ Years Never done    Zoster Vaccines Never done    DEXA Scan Never done    COVID-19 Vaccine Never done    Annual Depression Screening Never done       ALLERGIES  Allergies[1]    MEDICATIONS  Current Medications[2]    ACTIVE PROBLEM  Problem List[3]    PAST MEDICAL HISTORY  Past Medical History[4]    PAST SOCIAL HISTORY  Social History     Socioeconomic History    Marital status:      Spouse name: Not on file    Number of children: Not on file    Years of education: Not on file    Highest education level: Not on file   Occupational History    Not on file   Tobacco Use    Smoking status: Former     Current packs/day: 0.00     Average packs/day: 0.9 packs/day for 26.0 years (22.5 ttl pk-yrs)     Types: Cigarettes     Start date:      Quit date: 2016     Years since quittin.4    Smokeless tobacco: Never   Vaping Use    Vaping status: Never Used   Substance and Sexual Activity    Alcohol use: Not Currently    Drug use: Never    Sexual activity: Not Currently     Partners: Male   Other Topics Concern    Not on file   Social History Narrative    Relationships:   passed away 2022    Children: Julian and Stella (adults)    Pets: None    School: N/A    Work: Retired - supervisor at an office building     Origin: From [place], came to [place] 1978    Interests: Enjoys cooking, taking care of the kids, [place]    Spiritual: Zoroastrianism     Social Drivers of Health     Food Insecurity: Not on file   Transportation Needs: Not on file   Stress: Not on file   Housing Stability: Not on file       CAGE Alcohol Screen:   CAGE screening score of 0 on 6/4/2025, showing low risk of alcohol abuse.          PAST SURGICAL HISTORY  Past Surgical History[5]    PAST FAMILY HISTORY  Family History[6]    PHYSICAL EXAM  Vitals:    06/04/25 1403   BP: 128/84   Pulse: 90   Temp: 98.4 °F (36.9 °C)   SpO2: 98%   Weight: 249 lb (112.9 kg)   Height: 5' 5\" (1.651 m)      Body mass index is 41.44 kg/m².    Medicare Hearing Assessment:  Hearing Screening    Time taken: 6/4/2025  3:21 PM  Entry User: Sherly Peters CMA  Screening Method: Questionnaire  I have a problem hearing over the telephone: No I have trouble following the conversations when two or more people are talking at the same time: No   I have trouble understanding things on the TV: No I have to strain to understand conversations: No   I have to worry about missing the telephone ring or doorbell: No I have trouble hearing conversations in a noisy background such as a crowded room or restaurant: No   I get confused about where sounds come from: No I misunderstand some words in a sentence and need to ask people to repeat themselves: No   I especially have trouble understanding the speech of women and children: No I have trouble understanding the speaker in a large room such as at a meeting or place of Baptism: No   Many people I talk to seem to mumble (or don't speak clearly): No People get annoyed because I misunderstand what they say: No   I misunderstand what others are saying and make inappropriate responses: No I avoid social  activities because I cannot hear well and fear I will reply improperly: No   Family members and friends have told me they think I may have hearing loss: No           Visual Acuity:   Visual Acuity:               Both Eyes Visual Acuity: Corrected Both Eyes Chart Acuity: 20/60   Able To Tolerate Visual Acuity: Yes          GENERAL: NAD  HEENT: Moist mucous membranes, no tonsillar swelling or erythema, PERRLA bilat, TM translucent and non-bulging  NECK: Supple, non-tender  RESP: CTAB, no wheezing, no rales, no rhonchi  CV: RRR, no murmurs  GI: Soft, non-distended, non-tender, no guarding, no rebound, no masses  MSK: No edema to the bilateral knees.  Full range of motion of the bilateral knees.  Severe tenderness to the anterior and anteromedial aspects of the right knee.  Severe tenderness to the proximal tibia.  No significant tenderness to the posterior aspect of the right knee.  Anterior posterior drawer negative to the left knee.  Balbina's negative to the left knee.  Anterior and posterior drawer equivocal to the right knee as she is in significant pain.  Balbina's equivocal to the right knee.  SKIN: Warm and dry, no rashes  NEURO: Answering questions appropriately    LABS    Lab Results   Component Value Date    WBC 5.6 03/29/2025    HGB 12.0 03/29/2025    .0 03/29/2025       Lab Results   Component Value Date    AST 17 03/29/2025    ALT 18 03/29/2025    CA 9.1 03/29/2025    ALB 4.3 03/29/2025    TSH 0.324 (L) 03/29/2025    CREATSERUM 0.70 03/29/2025    GLU 93 03/29/2025       Lab Results   Component Value Date    CHOLEST 190 03/29/2025    HDL 53 03/29/2025     (H) 03/29/2025    TRIG 130 03/29/2025         DIAGNOSTICS    ASSESSMENT/PLAN  Problem List Items Addressed This Visit          HCC Problems    Adrenal nodule (HCC)    Patient with a left adrenal nodule seen on recent CT chest.  Has plans to see endocrinology for further evaluation         Chronic diastolic heart failure (HCC)    Patient  with a history of chronic diastolic heart failure and hypertension.  Seeing a new cardiologist now  Continue losartan 100 mg daily.  Continue Jardiance started by cardiology-can continue.  Continue furosemide 40 mg daily for swelling.         Class 3 severe obesity due to excess calories with serious comorbidity and body mass index (BMI) of 40.0 to 44.9 in adult    Patient with difficulty losing weight.  She does not want to see a dietician  Referred to weight specialist            Cardiac and Vasculature    Dyslipidemia    Elevated ASCVD risk score  Check CT calcium score         Relevant Orders    CT CALCIUM SCORING    Primary hypertension    Patient with a history of chronic diastolic heart failure and hypertension.  Blood pressures are controlled.   Continue losartan 100 mg daily.            Endocrine and Metabolic    Prediabetes    Diet and exercise advised  Currently on Jardiance prescribed for history of heart failure         Subclinical hyperthyroidism    Will monitor TSH reflex moving forward.         Thyroid nodule    Patient with a history of thyroid nodules  FNA completed in December 2023 showing benign follicular nodules            Gastrointestinal and Abdominal    Colon cancer screening    Family history of colon cancer.  Has plans for a colonoscopy            Musculoskeletal and Injuries    Acute pain of left knee    Patient with a history of left knee pain.  She completed a left knee arthroscopy in February 2025  Completed physical therapy  She reports that overall she is doing well at this time.         Acute pain of right knee    Patient with acute pain of the right knee as well as the right shin.  She reports that this gradually occurred while she was in [place] after doing a lot of walking.  No specific trauma or injury.  She is significantly tender to palpation throughout the anterior knee and in the region of the proximal tibia.  She states that she had an x-ray of her knee that was completed in  [place] that showed arthritic changes.  I will order an x-ray of her tibia and fibula given the tenderness throughout her right shin.  MRI ordered of the right knee.  She is using Poltram (tramadol and acetaminophen) that she received in [place]-she has 60 tablets of th this.  Referral to physical therapy provided.  Avoid strenuous activity.  Consider referral to orthopedic surgery moving forward.         Relevant Orders    Physical Therapy Referral - External    MRI KNEE, RIGHT (CPT=73721)    Pain in right shin    Patient with acute pain of the right knee as well as the right shin.  She reports that this gradually occurred while she was in [place] after doing a lot of walking.  No specific trauma or injury.  She is significantly tender to palpation throughout the anterior knee and in the region of the proximal tibia.  She states that she had an x-ray of her knee that was completed in [place] that showed arthritic changes.  I will order an x-ray of her tibia and fibula given the tenderness throughout her right shin.  MRI ordered of the right knee.  She is using Poltram (tramadol and acetaminophen) that she received in [place]-she has 60 tablets of th this.  Referral to physical therapy provided.  Avoid strenuous activity.  Consider referral to orthopedic surgery moving forward.         Relevant Orders    XR TIBIA + FIBULA (2 VIEWS), RIGHT (CPT=73590)    Rotator cuff tear    Patient with supraspinatus, infraspinatus and biceps tendon tears.  Has completed physical therapy  Declines seeing shoulder specialist for now - wants to focus on the knee            Sleep    MARLENY (obstructive sleep apnea)    Patient with obstructive sleep apnea  She recently had a polysomnography completed in March 2025  Referred to sleep specialist            Tobacco    History of cigarette smoking    Last CT lung screen completed January 31, 2025 showing no interval lung nodules.  Will repeat in 1 year - ordered         Relevant Orders    CT LUNG  LD SCREENING(CPT=71271)       Health Encounters    Health maintenance examination - Primary    Exercise and diet advised.  Labs reviewed in office  Shingles vaccine advised to complete at pharmacy  She reports she completed the prevnar vaccine -advised to send record  COVID vaccine advised.  Advanced directive information provided.  CT lung screen ordered for 2026  Mammogram ordered.  DEXA scan ordered.          Other Visit Diagnoses         Asymptomatic menopausal state        Relevant Orders    XR DEXA BONE DENSITOMETRY (CPT=77080)      Screening mammogram for breast cancer        Relevant Orders    MAYRA SYD 2D+3D SCREENING BILAT (CPT=77067/49028)            Return in about 1 year (around 6/4/2026) for medicare visit.    Royce Aranda MD  Family Medicine    6/4/2025         Supplementary Documentation:   General Health:  In the past six months, have you lost more than 10 pounds without trying?: 2 - No  Has your appetite been poor?: No  Type of Diet: Balanced, Vegetarian, Diabetic, Low Salt, Low Carb  How does the patient maintain a good energy level?: Appropriate Exercise, Daily Walks, Stretching  How would you describe your daily physical activity?: Moderate  How would you describe your current health state?: Fair  How do you maintain positive mental well-being?: Social Interaction, Puzzles, Visiting Friends, Games, Visiting Family  Have you had any immunizations at another office such as Influenza, Hepatitis B, Tetanus, or Pneumococcal?: No       Kerry Grier's SCREENING SCHEDULE   Tests on this list are recommended by your physician but may not be covered, or covered at this frequency, by your insurer.   Please check with your insurance carrier before scheduling to verify coverage.   PREVENTATIVE SERVICES FREQUENCY &  COVERAGE DETAILS LAST COMPLETION DATE   Diabetes Screening    Fasting Blood Sugar /  Glucose    One screening every 12 months if never tested or if previously tested but not diagnosed with  pre-diabetes   One screening every 6 months if diagnosed with pre-diabetes Lab Results   Component Value Date    GLU 93 03/29/2025        Cardiovascular Disease Screening    Lipid Panel  Cholesterol  Lipoprotein (HDL)  Triglycerides Covered every 5 years for all Medicare beneficiaries without apparent signs or symptoms of cardiovascular disease Lab Results   Component Value Date    CHOLEST 190 03/29/2025    HDL 53 03/29/2025     (H) 03/29/2025    TRIG 130 03/29/2025         Electrocardiogram (EKG)   Covered if needed at Welcome to Medicare, and non-screening if indicated for medical reasons -      Ultrasound Screening for Abdominal Aortic Aneurysm (AAA) Covered once in a lifetime for one of the following risk factors    Men who are 65-75 years old and have ever smoked    Anyone with a family history -     Colorectal Cancer Screening  Covered for ages 50-85; only need ONE of the following:    Colonoscopy   Covered every 10 years    Covered every 2 years if patient is at high risk or previous colonoscopy was abnormal -    Health Maintenance   Topic Date Due    Colorectal Cancer Screening  Never done       Flexible Sigmoidoscopy   Covered every 4 years -    Fecal Occult Blood Test Covered annually -   Bone Density Screening    Bone density screening    Covered every 2 years after age 65 if diagnosed with risk of osteoporosis or estrogen deficiency.    Covered yearly for long-term glucocorticoid medication use (Steroids) No results found for this or any previous visit.      Health Maintenance Due   Topic Date Due    DEXA Scan  Never done      Pap and Pelvic    Pap   Covered every 2 years for women at normal risk; Annually if at high risk -  No recommendations at this time    Chlamydia Annually if high risk -  No recommendations at this time   Screening Mammogram    Mammogram     Recommend annually for all female patients aged 40 and older    One baseline mammogram covered for patients aged 35-39 -    Health  Maintenance   Topic Date Due    Mammogram  Never done       Immunizations    Influenza Covered once per flu season  Please get every year -  No recommendations at this time    Pneumococcal Each vaccine ([id number] & [id number]) covered once after 65 Prevnar 13: -    [id number]: -     Pneumococcal Vaccination(1 of 2 - PCV) Never done    Hepatitis B One screening covered for patients with certain risk factors   -  No recommendations at this time    Tetanus Toxoid Not covered by Medicare Part B unless medically necessary (cut with metal); may be covered with your pharmacy prescription benefits -    Tetanus, Diptheria and Pertusis TD and TDaP Not covered by Medicare Part B -  No recommendations at this time    Zoster Not covered by Medicare Part B; may be covered with your pharmacy  prescription benefits -  Zoster Vaccines(1 of 2) Never done     Annual Monitoring of Persistent Medications (ACE/ARB, digoxin diuretics, anticonvulsants)    Potassium Annually Lab Results   Component Value Date    K 3.9 03/29/2025         Creatinine   Annually Lab Results   Component Value Date    CREATSERUM 0.70 03/29/2025         BUN Annually Lab Results   Component Value Date    BUN 13 03/29/2025       Drug Serum Conc Annually No results found for: \"DIGOXIN\", \"DIG\", \"VALP\"                    [1] No Known Allergies  [2]   Current Outpatient Medications   Medication Sig Dispense Refill    polyethylene glycol, PEG 3350-KCl-NaBcb-NaCl-NaSulf, 236 g Oral Recon Soln Take 4,000 mL by mouth As Directed. Take 2,000 mL the night before your procedure and 2,000 mL the morning of your procedure. 1 each 0    furosemide 40 MG Oral Tab Take 1 tablet (40 mg total) by mouth daily. 90 tablet 0    empagliflozin (JARDIANCE) 10 MG Oral Tab Take 1 tablet (10 mg total) by mouth daily. 90 tablet 0    losartan 100 MG Oral Tab Take 1 tablet (100 mg total) by mouth daily. 90 tablet 0   [3]   Patient Active Problem List  Diagnosis    Acute pain of left knee     Chronic diastolic heart failure (HCC)    History of cigarette smoking    Primary hypertension    Adrenal nodule (HCC)    Prediabetes    MARLENY (obstructive sleep apnea)    Thyroid nodule    Class 3 severe obesity due to excess calories with serious comorbidity and body mass index (BMI) of 40.0 to 44.9 in adult    Health maintenance examination    Colon cancer screening    Rotator cuff tear    Dyslipidemia    Pain in right shin    Acute pain of right knee    Subclinical hyperthyroidism   [4]   Past Medical History:   Adrenal nodule (HCC)    Chronic diastolic heart failure (HCC)    MARLENY (obstructive sleep apnea)    Primary hypertension    Rotator cuff tear    Right    Uterine cancer (HCC)   [5]   Past Surgical History:  Procedure Laterality Date    Cholecystectomy      Colonoscopy      Excis bartholin gland/cyst      Knee arthroscopy Left 02/04/2025    left knee arthroscopy with partial medial meniscectomy and partial lateral meniscectomy    Total abdom hysterectomy     [6]   Family History  Problem Relation Age of Onset    Hypertension Mother     Diabetes Mother     Skin cancer Mother     Colon Cancer Father 50    Stroke Sister     Breast Cancer Sister     Cancer Sister         Thyroid    Uterine Cancer Sister     No Known Problems Maternal Grandmother     Stroke Maternal Grandfather     No Known Problems Paternal Grandmother     No Known Problems Paternal Grandfather

## 2025-06-04 NOTE — ASSESSMENT & PLAN NOTE
Last CT lung screen completed January 31, 2025 showing no interval lung nodules.  Will repeat in 1 year - ordered

## 2025-06-04 NOTE — ASSESSMENT & PLAN NOTE
Patient with acute pain of the right knee as well as the right shin.  She reports that this gradually occurred while she was in [place] after doing a lot of walking.  No specific trauma or injury.  She is significantly tender to palpation throughout the anterior knee and in the region of the proximal tibia.  She states that she had an x-ray of her knee that was completed in [place] that showed arthritic changes.  I will order an x-ray of her tibia and fibula given the tenderness throughout her right shin.  MRI ordered of the right knee.  She is using Poltram (tramadol and acetaminophen) that she received in [place]-she has 60 tablets of th this.  Referral to physical therapy provided.  Avoid strenuous activity.  Consider referral to orthopedic surgery moving forward.

## 2025-06-04 NOTE — ASSESSMENT & PLAN NOTE
Exercise and diet advised.  Labs reviewed in office  Shingles vaccine advised to complete at pharmacy  She reports she completed the prevnar vaccine -advised to send record  COVID vaccine advised.  Advanced directive information provided.  CT lung screen ordered for 2026  Mammogram ordered.  DEXA scan ordered.

## 2025-06-05 ENCOUNTER — HOSPITAL ENCOUNTER (OUTPATIENT)
Dept: MRI IMAGING | Age: 65
Discharge: HOME | End: 2025-06-05
Attending: FAMILY MEDICINE
Payer: MEDICARE

## 2025-06-05 ENCOUNTER — HOSPITAL ENCOUNTER (OUTPATIENT)
Dept: GENERAL RADIOLOGY | Age: 65
Discharge: HOME | End: 2025-06-05
Attending: FAMILY MEDICINE
Payer: MEDICARE

## 2025-06-05 DIAGNOSIS — M79.661 PAIN IN RIGHT SHIN: ICD-10-CM

## 2025-06-05 DIAGNOSIS — M25.561 ACUTE PAIN OF RIGHT KNEE: ICD-10-CM

## 2025-06-05 PROCEDURE — 73590 X-RAY EXAM OF LOWER LEG: CPT | Performed by: FAMILY MEDICINE

## 2025-06-05 PROCEDURE — 73721 MRI JNT OF LWR EXTRE W/O DYE: CPT | Performed by: FAMILY MEDICINE

## 2025-06-10 ENCOUNTER — TELEPHONE (OUTPATIENT)
Dept: INTERNAL MEDICINE CLINIC | Facility: CLINIC | Age: 65
End: 2025-06-10

## 2025-06-10 DIAGNOSIS — M25.562 ACUTE PAIN OF LEFT KNEE: Primary | ICD-10-CM

## 2025-06-10 DIAGNOSIS — S83.281A ACUTE LATERAL MENISCUS TEAR OF RIGHT KNEE, INITIAL ENCOUNTER: ICD-10-CM

## 2025-06-10 DIAGNOSIS — M25.561 ACUTE PAIN OF RIGHT KNEE: ICD-10-CM

## 2025-06-10 NOTE — TELEPHONE ENCOUNTER
Discussed recent right MRI knee results with patient.  Advised orthopedic surgery evaluation.  Will refer her back to her recent prior orthopedic surgeon.  If not covered under her network however can refer to orthopedic surgery here.  She will notify me to let me know what is needed.

## 2025-06-18 ENCOUNTER — APPOINTMENT (OUTPATIENT)
Dept: GENERAL RADIOLOGY | Age: 65
End: 2025-06-18
Attending: ORTHOPAEDIC SURGERY

## 2025-06-18 ENCOUNTER — APPOINTMENT (OUTPATIENT)
Dept: ORTHOPEDICS | Age: 65
End: 2025-06-18

## 2025-06-18 VITALS — WEIGHT: 245.92 LBS | BODY MASS INDEX: 38.6 KG/M2 | HEIGHT: 67 IN

## 2025-06-18 DIAGNOSIS — M25.561 RIGHT KNEE PAIN, UNSPECIFIED CHRONICITY: Primary | ICD-10-CM

## 2025-06-18 ASSESSMENT — PAIN SCALES - GENERAL: PAINLEVEL_OUTOF10: 0

## 2025-06-18 NOTE — TELEPHONE ENCOUNTER
RN called [facility] and spoke to  staff. [facility] states pt has a clinic appt on 6/24/25; the same day as her colonoscopy.     Inquired if [facility] received our urgent request via fax (we sent twice). They confirmed they received fax but unsure if pt will be able to get clearance before 6/24/25 appt date.    [facility] staff sent a message to cardiology nurse, asked that they call GI office to discuss further. Provided both GI office and RN triage numbers.     Pt may need to reschedule colonoscopy if unable to get clearance in time.

## 2025-06-19 ENCOUNTER — TELEPHONE (OUTPATIENT)
Dept: SPORTS MEDICINE | Age: 65
End: 2025-06-19

## 2025-06-19 NOTE — TELEPHONE ENCOUNTER
I spoke with patient and date of birth verified.  Patient states since her appointment with the cardiologist is on 06/24 and she cannot get it sooner, she would like to reschedule her procedure to a later date.    Procedure canceled in Epic and surgical case change request sent.

## 2025-06-20 NOTE — TELEPHONE ENCOUNTER
2nd,Overdue reminder letter sent out via My chart for the following:  Stool Culture W/Shigatoxin (Order #[id number]) on 4/4/25      C. diff toxigenic PCR (OPT) (Order #[id number]) on 4/4/25

## 2025-06-23 ENCOUNTER — PREP FOR CASE (OUTPATIENT)
Dept: ORTHOPEDICS | Age: 65
End: 2025-06-23

## 2025-06-23 DIAGNOSIS — Z01.818 PRE-OP EXAM: ICD-10-CM

## 2025-06-23 DIAGNOSIS — M25.561 RIGHT KNEE PAIN, UNSPECIFIED CHRONICITY: Primary | ICD-10-CM

## 2025-06-24 ENCOUNTER — HOSPITAL ENCOUNTER (OUTPATIENT)
Age: 65
End: 2025-06-24
Attending: ORTHOPAEDIC SURGERY | Admitting: ORTHOPAEDIC SURGERY

## 2025-07-14 ENCOUNTER — TELEPHONE (OUTPATIENT)
Dept: ORTHOPEDICS | Age: 65
End: 2025-07-14

## 2025-08-12 ENCOUNTER — APPOINTMENT (OUTPATIENT)
Dept: ORTHOPEDICS | Age: 65
End: 2025-08-12

## 2025-08-19 ENCOUNTER — APPOINTMENT (OUTPATIENT)
Dept: ORTHOPEDICS | Age: 65
End: 2025-08-19

## (undated) DEVICE — TUBING SCT INFLOW CSST CROSSFLOW ARTHROSCOPY PUMP

## (undated) DEVICE — GLOVE SURG 6.5 PROTEXIS PI MIC PWDR FREE SMTH BEAD CUFF

## (undated) DEVICE — PAD ABD L8 IN X W7.5 IN ABS NONWOVEN SEAL EDGE CELLULOSE

## (undated) DEVICE — CUTTER ENDO 3.5MM RESCT SHVR BLADE STRL 125MM LF DISP

## (undated) DEVICE — SUTURE ETHILON MTPS 3-0 PS1 18IN MONO NABSB BLK 1663H

## (undated) NOTE — LETTER
6/20/2025          Kerry FARAH Krzysiak    [address]    [facility] [address]         Dear Kerry,    Our records indicate that the tests ordered for you by BRUNO Louise  have not been done.  If you have, in fact, already completed the tests or you do not wish to have the tests done, please contact our office at THE NUMBER LISTED BELOW.  Otherwise, please proceed with the testing.  Enclosed is a duplicate order for your convenience.    Labs Order;  Stool Culture W/Shigatoxin (Order #[id number]) on 4/4/25      C. diff toxigenic PCR (OPT) (Order #[id number]) on 4/4/25         Sincerely,    BRUNO Louise  [facility]  [address] [id number] [phone]

## (undated) NOTE — LETTER
5/12/2025          Kerry FARAH Krzysiak    [address] [place] [id number]         Dear Kerry,    Our records indicate that the tests ordered for you by BRUNO Louise  have not been done.  If you have, in fact, already completed the tests or you do not wish to have the tests done, please contact our office at THE NUMBER LISTED BELOW.  Otherwise, please proceed with the testing.      Stool Culture W/Shigatoxin (Order #[id number]) on 4/4/25     C. diff toxigenic PCR (OPT) (Order #[id number]) on 4/4/25       Sincerely,    BRUNO Louise  [facility], [facility] [place]  [address] [id number] [phone]